# Patient Record
Sex: FEMALE | Race: WHITE | NOT HISPANIC OR LATINO | URBAN - METROPOLITAN AREA
[De-identification: names, ages, dates, MRNs, and addresses within clinical notes are randomized per-mention and may not be internally consistent; named-entity substitution may affect disease eponyms.]

---

## 2019-07-05 PROBLEM — K92.1 GASTROINTESTINAL HEMORRHAGE WITH MELENA: Status: ACTIVE | Noted: 2019-07-05

## 2019-07-05 PROBLEM — K62.5 RECTAL BLEEDING: Status: ACTIVE | Noted: 2019-07-05

## 2019-07-05 PROBLEM — D62 ACUTE BLOOD LOSS ANEMIA: Status: ACTIVE | Noted: 2019-07-05

## 2019-07-31 ENCOUNTER — OFFICE VISIT (OUTPATIENT)
Dept: GASTROENTEROLOGY | Facility: CLINIC | Age: 64
End: 2019-07-31
Payer: COMMERCIAL

## 2019-07-31 ENCOUNTER — LAB VISIT (OUTPATIENT)
Dept: LAB | Facility: HOSPITAL | Age: 64
End: 2019-07-31
Attending: NURSE PRACTITIONER
Payer: COMMERCIAL

## 2019-07-31 VITALS
BODY MASS INDEX: 33.51 KG/M2 | DIASTOLIC BLOOD PRESSURE: 74 MMHG | WEIGHT: 189.13 LBS | HEIGHT: 63 IN | HEART RATE: 85 BPM | SYSTOLIC BLOOD PRESSURE: 176 MMHG

## 2019-07-31 DIAGNOSIS — Z09 HOSPITAL DISCHARGE FOLLOW-UP: Primary | ICD-10-CM

## 2019-07-31 DIAGNOSIS — D50.9 IRON DEFICIENCY ANEMIA, UNSPECIFIED IRON DEFICIENCY ANEMIA TYPE: ICD-10-CM

## 2019-07-31 DIAGNOSIS — R03.0 ELEVATED BLOOD PRESSURE READING: ICD-10-CM

## 2019-07-31 DIAGNOSIS — Z98.84 HX OF BARIATRIC SURGERY: ICD-10-CM

## 2019-07-31 DIAGNOSIS — Z98.890 HISTORY OF ESOPHAGOGASTRODUODENOSCOPY (EGD): ICD-10-CM

## 2019-07-31 DIAGNOSIS — R19.5 OCCULT BLOOD POSITIVE STOOL: ICD-10-CM

## 2019-07-31 DIAGNOSIS — E83.51 HYPOCALCEMIA: ICD-10-CM

## 2019-07-31 DIAGNOSIS — Q27.30 ARTERIOVENOUS MALFORMATION (AVM): ICD-10-CM

## 2019-07-31 LAB
ERYTHROCYTE [DISTWIDTH] IN BLOOD BY AUTOMATED COUNT: 17.3 % (ref 11.5–14.5)
FERRITIN SERPL-MCNC: 4 NG/ML (ref 20–300)
HCT VFR BLD AUTO: 29.3 % (ref 37–48.5)
HGB BLD-MCNC: 8.3 G/DL (ref 12–16)
IRON SERPL-MCNC: 22 UG/DL (ref 30–160)
MCH RBC QN AUTO: 24 PG (ref 27–31)
MCHC RBC AUTO-ENTMCNC: 28.3 G/DL (ref 32–36)
MCV RBC AUTO: 85 FL (ref 82–98)
PLATELET # BLD AUTO: 422 K/UL (ref 150–350)
PMV BLD AUTO: 10 FL (ref 9.2–12.9)
RBC # BLD AUTO: 3.46 M/UL (ref 4–5.4)
SATURATED IRON: 4 % (ref 20–50)
TOTAL IRON BINDING CAPACITY: 517 UG/DL (ref 250–450)
TRANSFERRIN SERPL-MCNC: 349 MG/DL (ref 200–375)
WBC # BLD AUTO: 5.99 K/UL (ref 3.9–12.7)

## 2019-07-31 PROCEDURE — 99999 PR PBB SHADOW E&M-NEW PATIENT-LVL III: CPT | Mod: PBBFAC,,, | Performed by: NURSE PRACTITIONER

## 2019-07-31 PROCEDURE — 36415 COLL VENOUS BLD VENIPUNCTURE: CPT | Mod: PO

## 2019-07-31 PROCEDURE — 3008F BODY MASS INDEX DOCD: CPT | Mod: CPTII,S$GLB,, | Performed by: NURSE PRACTITIONER

## 2019-07-31 PROCEDURE — 82728 ASSAY OF FERRITIN: CPT

## 2019-07-31 PROCEDURE — 99203 PR OFFICE/OUTPT VISIT, NEW, LEVL III, 30-44 MIN: ICD-10-PCS | Mod: S$GLB,,, | Performed by: NURSE PRACTITIONER

## 2019-07-31 PROCEDURE — 3008F PR BODY MASS INDEX (BMI) DOCUMENTED: ICD-10-PCS | Mod: CPTII,S$GLB,, | Performed by: NURSE PRACTITIONER

## 2019-07-31 PROCEDURE — 83540 ASSAY OF IRON: CPT

## 2019-07-31 PROCEDURE — 99999 PR PBB SHADOW E&M-NEW PATIENT-LVL III: ICD-10-PCS | Mod: PBBFAC,,, | Performed by: NURSE PRACTITIONER

## 2019-07-31 PROCEDURE — 85027 COMPLETE CBC AUTOMATED: CPT

## 2019-07-31 PROCEDURE — 99203 OFFICE O/P NEW LOW 30 MIN: CPT | Mod: S$GLB,,, | Performed by: NURSE PRACTITIONER

## 2019-07-31 RX ORDER — LANOLIN ALCOHOL/MO/W.PET/CERES
1000 CREAM (GRAM) TOPICAL DAILY
COMMUNITY

## 2019-07-31 NOTE — PROGRESS NOTES
"Subjective:       Patient ID: Baliwnder Rodriguez is a 64 y.o. female Body mass index is 33.51 kg/m².    Chief Complaint: Labs Only (gastric AVM) and Establish Care (Dr. Cadrenas)    This patient is new to me.    Patient is here to establish care with Dr. Cardenas. Patient reports history of multiple gastric AVMs throughout the years that have required numerous EGDs and hospitalization. Patient reports she can sense when an episode is about to occur/is occurring with symptoms of fatigue & dark stools. Patient reports the multiple hospitalizations has become quite expensive and reports she wants to establish care with a GI doctor who she can call when she notices GI symptoms so she can have blood work done and scheduled outpatient endoscopies to try an avoid hospitalizations.    Reviewed hospital discharge summary: "Admission Date: 7/5/2019  Hospital Length of Stay: 0 days  Discharge Date and Time: 7/7/2019  2:45 PM  Attending Physician: Makayla att. providers found   Discharging Provider: Pierce Lenz MD  Primary Care Provider: Primary Doctor Makayla  HPI:   The patient is a 63-year-old woman with no significant past medical history other than gastric AVMs requiring frequent transfusions throughout her life.  Her last hospital stay was 7 years ago.  She has not had any bleeding since then.  She started with melanotic stools yesterday along with fatigue.  She denies any abdominal discomfort.  She presents to the ED this evening where laboratory studies confirm hemoglobin of 7.7.  She is stable hemodynamically.  2 units of packed red blood cells have been ordered.  Gastroenterology, Dr. Ochoa has also been consulted through the ED.  She is currently receiving her 1st unit of packed red blood cells.  IV Protonix loading dose and subsequent drip has been initiated.  Hospital Medicine has been called for further evaluation.  Procedure(s) (LRB):  EGD (ESOPHAGOGASTRODUODENOSCOPY) (N/A)  Hospital Course:   Patient admitted with suspected " "upper GI bleed. Patient placed on PPI drip. Extensive history of AVM bleeds in the past. GI consulted and plans made for EGD. EGD with no obvious source of bleeding. H and H remained stable and no further bleeding. Patient eventually discharged home with close follow up including GI follow up."    Reviewed Dr. Ochoa's operative note from 7/6/19 EGD: "ASSESSMENT:  As above.  We will plan colonoscopy.  If this is negative, the   patient would likely benefit from a double balloon-assisted enteroscopy in order   to evaluate her Basilio limb."    GI Problem   Primary symptoms do not include fever, weight loss, fatigue, abdominal pain, nausea, vomiting, diarrhea, melena, hematochezia or dysuria. The problem has been resolved (patient reports feeling great since hospitalization).   The illness does not include chills, anorexia, dysphagia, odynophagia or constipation. Significant associated medical issues include PUD. Associated medical issues do not include inflammatory bowel disease, GERD or gastric bypass. Associated medical issues comments: history of gastric stapling; patient denies history of gastric bypass.     Review of Systems   Constitutional: Negative for appetite change, chills, fatigue, fever and weight loss.   HENT: Negative for sore throat and trouble swallowing.    Respiratory: Negative for cough, choking and shortness of breath.    Cardiovascular: Negative for chest pain.   Gastrointestinal: Negative for abdominal pain, anal bleeding, anorexia, blood in stool, constipation, diarrhea, dysphagia, hematochezia, melena, nausea, rectal pain and vomiting.   Genitourinary: Negative for difficulty urinating, dysuria and flank pain.   Neurological: Negative for weakness.       No LMP recorded. Patient is postmenopausal.  Past Medical History:   Diagnosis Date    AVM (arteriovenous malformation) of stomach, acquired with hemorrhage 1993     Past Surgical History:   Procedure Laterality Date    COLONOSCOPY  ~2015    " "Dr. Jacobsen    COLONOSCOPY  04/07/2009    Dr. Jacobsen, sent for scanning: melena throughout colon, prominent hemorrhoids, tortous colon, otherwise unremarkable findings    EGD (ESOPHAGOGASTRODUODENOSCOPY) N/A 7/6/2019    [facility] Virgilio [facility] Jr[facility]    ENTEROSCOPIC PUSH PROCEDURE  04/08/2009    Dr. Jacobsen, sent for scanning: distal esophageal erythema, gastric remnant appeared to be unremarkable, "the limbs of the Basilio-en-Y appeared to be unremarkable as well as the conduit from the stomach to the Basilio-en-Y"    ENTEROSCOPIC PUSH PROCEDURE  12/05/2009    Dr. Jacobsen, sent for scanning: blood seen, bleeder not found, suspect AVM, a couple of erosions in stomach    ENTEROSCOPIC PUSH PROCEDURE  12/06/2009    Dr. Jacobsen, sent for scanning: distal esophageal erythema, s/p gastric bypass, large arteriovenous malformation noted in the proximal jenunum successfully ablated, site tattooed    gastric stapling      UPPER GASTROINTESTINAL ENDOSCOPY  07/06/2019    Dr. Ochoa, in notes: "S/P Basilio - N - Y gastric bypass" & "No source GIB seen"    UPPER GASTROINTESTINAL ENDOSCOPY  07/22/2010    EGD with push enteroscopy; Dr. Jacobsen, sent for scanning (hard to read handwriting)    UPPER GASTROINTESTINAL ENDOSCOPY  04/05/2009    Dr. Delacruz, sent for scanning: non-bleeding anastomotic ulcer, post gastric stapling    UPPER GASTROINTESTINAL ENDOSCOPY  12/04/2009    Dr. Jacobsen, sent for scanning, EGD with push enteroscopy: distal esophageal erythema and severe gastroparesis, blood seen in duodenum and proximal jejenum    video capsule endoscopy       Family History   Problem Relation Age of Onset    Colon cancer Neg Hx     Crohn's disease Neg Hx     Ulcerative colitis Neg Hx     Celiac disease Neg Hx     Esophageal cancer Neg Hx     Stomach cancer Neg Hx      Wt Readings from Last 10 Encounters:   07/31/19 85.8 kg (189 lb 2.5 oz)   07/05/19 83 kg (182 lb 14.4 oz)     Lab Results   Component Value Date    WBC 5.99 " 07/31/2019    HGB 8.3 (L) 07/31/2019    HCT 29.3 (L) 07/31/2019    MCV 85 07/31/2019     (H) 07/31/2019     Lab Results   Component Value Date    IRON 22 (L) 07/31/2019    TIBC 517 (H) 07/31/2019    FERRITIN 4 (L) 07/31/2019     CMP  Sodium   Date Value Ref Range Status   07/07/2019 139 136 - 145 mmol/L Final     Potassium   Date Value Ref Range Status   07/07/2019 3.5 3.5 - 5.1 mmol/L Final     Chloride   Date Value Ref Range Status   07/07/2019 107 95 - 110 mmol/L Final     CO2   Date Value Ref Range Status   07/07/2019 25 22 - 31 mmol/L Final     Glucose   Date Value Ref Range Status   07/07/2019 103 70 - 110 mg/dL Final     Comment:     The ADA recommends the following guidelines for fasting glucose:  Normal:       less than 100 mg/dL  Prediabetes:  100 mg/dL to 125 mg/dL  Diabetes:     126 mg/dL or higher       BUN, Bld   Date Value Ref Range Status   07/07/2019 12 7 - 18 mg/dL Final     Creatinine   Date Value Ref Range Status   07/07/2019 0.61 0.50 - 1.40 mg/dL Final     Calcium   Date Value Ref Range Status   07/07/2019 7.9 (L) 8.4 - 10.2 mg/dL Final     Total Protein   Date Value Ref Range Status   07/07/2019 5.8 (L) 6.0 - 8.4 g/dL Final     Albumin   Date Value Ref Range Status   07/07/2019 3.4 (L) 3.5 - 5.2 g/dL Final     Total Bilirubin   Date Value Ref Range Status   07/07/2019 0.3 0.2 - 1.3 mg/dL Final     Alkaline Phosphatase   Date Value Ref Range Status   07/07/2019 55 38 - 145 U/L Final     AST   Date Value Ref Range Status   07/07/2019 19 14 - 36 U/L Final     ALT   Date Value Ref Range Status   07/07/2019 23 10 - 44 U/L Final     Anion Gap   Date Value Ref Range Status   07/07/2019 7 (L) 8 - 16 mmol/L Final     eGFR if    Date Value Ref Range Status   07/07/2019 >60 >60 mL/min/1.73 m^2 Final     eGFR if non    Date Value Ref Range Status   07/07/2019 >60 >60 mL/min/1.73 m^2 Final     Comment:     Calculation used to obtain the estimated glomerular  "filtration  rate (eGFR) is the CKD-EPI equation.        Lab Results   Component Value Date    TSH 3.900 07/05/2019     Lab Results   Component Value Date    OCCULTBLOOD Positive (A) 07/05/2019     Reviewed prior medical records including endoscopy history (see surgical history).  Reviewed medical records received from patient (from [facility] & Dr. Jacobsen), summarized below and in medical & surgical history (endoscopies, etc), & given to nurse to be scanned into system:   See records for lab results.  7/22/10-7/23/10 discharge summary reviewed: "patient was admitted after the push enteroscopy because we found significant bleeding and anemia." "She has a known arteriovenous malformation right near the anastomosis distally in the jejenum."  4/4/09-4/9/09 discharge summary reviewed: "the patient apparently has an ulcer at the anastomosis on upper gastrointestinal endoscopy. This was a very small ulcer." "on April 7, 2009 she underwent colonoscopy. The colonoscopy showed black stool throughout, but no etiology of her bleeding." "She underwent a push enteroscopy on April 8, 2009, but again that did not show any etiology. The previously seen small ulcer had completely healed by the time the push enteroscopy was done."  Objective:      Physical Exam   Constitutional: She is oriented to person, place, and time. She appears well-developed and well-nourished. No distress.   HENT:   Mouth/Throat: Oropharynx is clear and moist and mucous membranes are normal. No oral lesions. No oropharyngeal exudate.   Eyes: Pupils are equal, round, and reactive to light. Conjunctivae are normal. No scleral icterus.   Pulmonary/Chest: Effort normal and breath sounds normal. No respiratory distress. She has no wheezes.   Abdominal: Soft. Normal appearance and bowel sounds are normal. She exhibits no distension, no abdominal bruit and no mass. There is no tenderness. There is no rigidity, no rebound, no guarding, no tenderness at " McBurney's point and negative Lerma's sign.   Neurological: She is alert and oriented to person, place, and time.   Skin: Skin is warm and dry. No rash noted. She is not diaphoretic. No erythema. No pallor.   Non-jaundiced   Psychiatric: She has a normal mood and affect. Her behavior is normal. Judgment and thought content normal.   Nursing note and vitals reviewed.      Assessment:       1. Hospital discharge follow-up    2. History of esophagogastroduodenoscopy (EGD)    3. History of arteriovenous malformation (AVM) of intestines    4. Iron deficiency anemia, unspecified iron deficiency anemia type    5. Occult blood positive stool    6. Hypocalcemia    7. Hx of bariatric surgery    8. Elevated blood pressure reading        Plan:       Hospital discharge follow-up  Recommend follow-up with Primary Care Provider for continued evaluation and management.    History of esophagogastroduodenoscopy (EGD), History of arteriovenous malformation (AVM) of intestines, Iron deficiency anemia, unspecified iron deficiency anemia type & Occult blood positive stool  -     CBC Without Differential; Future; Expected date: 07/31/2019  -     Iron and TIBC; Future; Expected date: 07/31/2019  -     Ferritin; Future; Expected date: 07/31/2019  - continue oral iron supplement as directed  - discussed with patient the different ways that anemia occurs: blood loss (such as from the gi tract), the body is not making enough, or the body is breaking down the rbcs too quickly; recommend colonoscopy to further evaluate gi tract for possible blood loss and pending results of endoscopies, possible EGD with small bowel enteroscopy/UGI with Small Bowel Follow Through/video capsule study  -follow-up with PCP and/or hematology for continued evaluation and management    Hypocalcemia  Recommend follow-up with Primary Care Provider for continued evaluation and management.    Hx of bariatric surgery (patient reports history of gastric stapling; patient  denies history of gastric bypass surgery)  Recommend follow-up with bariatric surgery for continued evaluation and management.    Elevated blood pressure reading  - instructed patient to monitor blood pressure at home and follow-up with PCP &/or cardiologist  - If blood pressure remains elevated and/or experiencing symptoms of headache, chest pain, shortness of breath, and/or blurred vision, recommend going to ER for further evaluation and management    Follow up in about 1 month (around 8/31/2019), or if symptoms worsen or fail to improve.      If no improvement in symptoms or symptoms worsen, call/follow-up at clinic or go to ER.

## 2019-07-31 NOTE — PATIENT INSTRUCTIONS

## 2019-08-02 ENCOUNTER — TELEPHONE (OUTPATIENT)
Dept: GASTROENTEROLOGY | Facility: CLINIC | Age: 64
End: 2019-08-02

## 2019-08-02 DIAGNOSIS — D50.9 IRON DEFICIENCY ANEMIA, UNSPECIFIED IRON DEFICIENCY ANEMIA TYPE: Primary | ICD-10-CM

## 2019-08-02 NOTE — TELEPHONE ENCOUNTER
Please call to inform & review the results with the patient- blood counts showed anemia remains and is stable and appears to be improving. Iron levels are decreased.  Continue with previous recommendations, including colonoscopy as scheduled and continue iron supplement as directed.  Repeat blood work in 4-6 weeks.    If no improvement in symptoms or symptoms worsen, call/follow-up at clinic or go to ER.  Please release results to patient's mychart once you have discussed results and recommendations with patient.  Thanks,

## 2019-08-08 ENCOUNTER — ANESTHESIA EVENT (OUTPATIENT)
Dept: ENDOSCOPY | Facility: HOSPITAL | Age: 64
End: 2019-08-08
Payer: COMMERCIAL

## 2019-08-09 ENCOUNTER — HOSPITAL ENCOUNTER (OUTPATIENT)
Facility: HOSPITAL | Age: 64
Discharge: HOME | End: 2019-08-09
Attending: INTERNAL MEDICINE | Admitting: INTERNAL MEDICINE
Payer: COMMERCIAL

## 2019-08-09 ENCOUNTER — ANESTHESIA (OUTPATIENT)
Dept: ENDOSCOPY | Facility: HOSPITAL | Age: 64
End: 2019-08-09
Payer: COMMERCIAL

## 2019-08-09 VITALS
WEIGHT: 190 LBS | SYSTOLIC BLOOD PRESSURE: 118 MMHG | HEART RATE: 74 BPM | HEIGHT: 62 IN | BODY MASS INDEX: 34.96 KG/M2 | TEMPERATURE: 98 F | OXYGEN SATURATION: 98 % | DIASTOLIC BLOOD PRESSURE: 63 MMHG | RESPIRATION RATE: 16 BRPM

## 2019-08-09 DIAGNOSIS — K62.5 RECTAL BLEEDING: ICD-10-CM

## 2019-08-09 PROCEDURE — D9220A PRA ANESTHESIA: Mod: CRNA,,, | Performed by: NURSE ANESTHETIST, CERTIFIED REGISTERED

## 2019-08-09 PROCEDURE — D9220A PRA ANESTHESIA: Mod: ANES,,, | Performed by: ANESTHESIOLOGY

## 2019-08-09 PROCEDURE — 37000008 HC ANESTHESIA 1ST 15 MINUTES: Mod: PO | Performed by: INTERNAL MEDICINE

## 2019-08-09 PROCEDURE — 37000009 HC ANESTHESIA EA ADD 15 MINS: Mod: PO | Performed by: INTERNAL MEDICINE

## 2019-08-09 PROCEDURE — D9220A PRA ANESTHESIA: ICD-10-PCS | Mod: CRNA,,, | Performed by: NURSE ANESTHETIST, CERTIFIED REGISTERED

## 2019-08-09 PROCEDURE — D9220A PRA ANESTHESIA: ICD-10-PCS | Mod: ANES,,, | Performed by: ANESTHESIOLOGY

## 2019-08-09 PROCEDURE — 63600175 PHARM REV CODE 636 W HCPCS: Mod: PO | Performed by: NURSE ANESTHETIST, CERTIFIED REGISTERED

## 2019-08-09 PROCEDURE — 45378 DIAGNOSTIC COLONOSCOPY: CPT | Mod: ,,, | Performed by: INTERNAL MEDICINE

## 2019-08-09 PROCEDURE — 45378 DIAGNOSTIC COLONOSCOPY: CPT | Mod: PO | Performed by: INTERNAL MEDICINE

## 2019-08-09 PROCEDURE — 45378 PR COLONOSCOPY,DIAGNOSTIC: ICD-10-PCS | Mod: ,,, | Performed by: INTERNAL MEDICINE

## 2019-08-09 PROCEDURE — 63600175 PHARM REV CODE 636 W HCPCS: Mod: PO | Performed by: ANESTHESIOLOGY

## 2019-08-09 RX ORDER — PROPOFOL 10 MG/ML
VIAL (ML) INTRAVENOUS
Status: DISCONTINUED | OUTPATIENT
Start: 2019-08-09 | End: 2019-08-09

## 2019-08-09 RX ORDER — SODIUM CHLORIDE, SODIUM LACTATE, POTASSIUM CHLORIDE, CALCIUM CHLORIDE 600; 310; 30; 20 MG/100ML; MG/100ML; MG/100ML; MG/100ML
INJECTION, SOLUTION INTRAVENOUS CONTINUOUS
Status: DISCONTINUED | OUTPATIENT
Start: 2019-08-09 | End: 2019-08-09 | Stop reason: HOSPADM

## 2019-08-09 RX ORDER — LIDOCAINE HYDROCHLORIDE 10 MG/ML
1 INJECTION, SOLUTION EPIDURAL; INFILTRATION; INTRACAUDAL; PERINEURAL ONCE
Status: DISCONTINUED | OUTPATIENT
Start: 2019-08-09 | End: 2019-08-09 | Stop reason: HOSPADM

## 2019-08-09 RX ORDER — LIDOCAINE HCL/PF 100 MG/5ML
SYRINGE (ML) INTRAVENOUS
Status: DISCONTINUED | OUTPATIENT
Start: 2019-08-09 | End: 2019-08-09

## 2019-08-09 RX ORDER — SODIUM CHLORIDE 0.9 % (FLUSH) 0.9 %
10 SYRINGE (ML) INJECTION
Status: DISCONTINUED | OUTPATIENT
Start: 2019-08-09 | End: 2019-08-09 | Stop reason: HOSPADM

## 2019-08-09 RX ORDER — SODIUM CHLORIDE, SODIUM LACTATE, POTASSIUM CHLORIDE, CALCIUM CHLORIDE 600; 310; 30; 20 MG/100ML; MG/100ML; MG/100ML; MG/100ML
INJECTION, SOLUTION INTRAVENOUS CONTINUOUS
Status: DISCONTINUED | OUTPATIENT
Start: 2019-08-09 | End: 2019-08-09

## 2019-08-09 RX ADMIN — PROPOFOL 50 MG: 10 INJECTION, EMULSION INTRAVENOUS at 09:08

## 2019-08-09 RX ADMIN — PROPOFOL 100 MG: 10 INJECTION, EMULSION INTRAVENOUS at 09:08

## 2019-08-09 RX ADMIN — LIDOCAINE HYDROCHLORIDE 100 MG: 20 INJECTION, SOLUTION INTRAVENOUS at 09:08

## 2019-08-09 RX ADMIN — SODIUM CHLORIDE, SODIUM LACTATE, POTASSIUM CHLORIDE, AND CALCIUM CHLORIDE: .6; .31; .03; .02 INJECTION, SOLUTION INTRAVENOUS at 09:08

## 2019-08-09 NOTE — BRIEF OP NOTE
Discharge Note  Short Stay      SUMMARY     Admit Date: 8/9/2019    Attending Physician: Dudley Cardenas Jr., MD     Discharge Physician: Dudley Cardenas Jr., MD    Discharge Date: 8/9/2019 10:17 AM    Final Diagnosis: Iron deficiency anemia, unspecified iron deficiency anemia type [D50.9]  Impression:  - Redundant colon.                       - The examination was otherwise normal.                       - The examined portion of the ileum was normal.                       - No specimens collected.  Recommendation:      - Discharge patient to home.                       - High fiber diet.                       - Use fiber, for example Benefiber, Citrucel,                        Fibercon, Konsyl or Metamucil.                       - Take a PROBIOTIC, such as ALIGN or CULTURELLE or                        MICHELLE-Q (all non-prescription), every day for a                        month.                       - Repeat colonoscopy in 8 years for screening                        purposes.                       - Continue present medications.                       - Patient has a contact number available for                        emergencies. The signs and symptoms of potential                        delayed complications were discussed with the                        patient. Return to normal activities tomorrow.                        Written discharge instructions were provided to the                        patient.                       - Return to normal activities tomorrow.  Dudley Cardenas MD  8/9/2019  Disposition: HOME OR SELF CARE    Patient Instructions:   Current Discharge Medication List      CONTINUE these medications which have NOT CHANGED    Details   cyanocobalamin (VITAMIN B-12) 1000 MCG tablet Take 1,000 mcg by mouth once daily.      ferrous sulfate (FEOSOL) 325 mg (65 mg iron) Tab tablet Take 1 tablet (325 mg total) by mouth daily with breakfast.  Refills: 0             Discharge Procedure Orders  (must include Diet, Follow-up, Activity)    Follow Up:  Follow up with PCP as per your routine.  Please follow a high fiber diet.  Activity as tolerated.    No driving day of procedure.    PROBIOTICS:  Now that your colon is so cleaned out, now is a good time for a round of PROBIOTICS.  Take a Probiotic product such as Benefiber or Align or Culturelle or Tiffanie-Q, every day for a month.                  (The products listed are non-prescription, but you may need to ask the pharmacist for their location.)  Repeat this 4-6 times a year.

## 2019-08-09 NOTE — H&P
"History & Physical - Short Stay  Gastroenterology      SUBJECTIVE:     Procedure: Colonoscopy    Chief Complaint/Indication for Procedure: Rectal bleeding    History of Present Illness:  Office Visit     7/31/2019  [facility]      MELVIN Mckeon   Gastroenterology   Hospital discharge follow-up +7 more   Dx   Labs Only   , Establish Care   ; Referred by Aaareferral Self   Reason for Visit    Progress Notes        Subjective:       Patient ID: Balwinder Rodriguez is a 64 y.o. female Body mass index is 33.51 kg/m².     Chief Complaint: Labs Only (gastric AVM) and Establish Care (Dr. Cardenas)     This patient is new to me.     Patient is here to establish care with Dr. Cardenas. Patient reports history of multiple gastric AVMs throughout the years that have required numerous EGDs and hospitalization. Patient reports she can sense when an episode is about to occur/is occurring with symptoms of fatigue & dark stools. Patient reports the multiple hospitalizations has become quite expensive and reports she wants to establish care with a GI doctor who she can call when she notices GI symptoms so she can have blood work done and scheduled outpatient endoscopies to try an avoid hospitalizations.     Reviewed hospital discharge summary: "Admission Date: 7/5/2019  Hospital Length of Stay: 0 days  Discharge Date and Time: 7/7/2019  2:45 PM  Attending Physician: No att. providers found   Discharging Provider: Pierce Lenz MD  Primary Care Provider: Primary Doctor No  HPI:   The patient is a 63-year-old woman with no significant past medical history other than gastric AVMs requiring frequent transfusions throughout her life.  Her last hospital stay was 7 years ago.  She has not had any bleeding since then.  She started with melanotic stools yesterday along with fatigue.  She denies any abdominal discomfort.  She presents to the ED this evening where laboratory studies confirm hemoglobin of 7.7.  She is " "stable hemodynamically.  2 units of packed red blood cells have been ordered.  Gastroenterology, Dr. Ochoa has also been consulted through the ED.  She is currently receiving her 1st unit of packed red blood cells.  IV Protonix loading dose and subsequent drip has been initiated.  Hospital Medicine has been called for further evaluation.  Procedure(s) (LRB):  EGD (ESOPHAGOGASTRODUODENOSCOPY) (N/A)  Hospital Course:   Patient admitted with suspected upper GI bleed. Patient placed on PPI drip. Extensive history of AVM bleeds in the past. GI consulted and plans made for EGD. EGD with no obvious source of bleeding. H and H remained stable and no further bleeding. Patient eventually discharged home with close follow up including GI follow up."     Reviewed Dr. Ochoa's operative note from 7/6/19 EGD: "ASSESSMENT:  As above.  We will plan colonoscopy.  If this is negative, the   patient would likely benefit from a double balloon-assisted enteroscopy in order   to evaluate her Basilio limb."     GI Problem   Primary symptoms do not include fever, weight loss, fatigue, abdominal pain, nausea, vomiting, diarrhea, melena, hematochezia or dysuria. The problem has been resolved (patient reports feeling great since hospitalization).   The illness does not include chills, anorexia, dysphagia, odynophagia or constipation. Significant associated medical issues include PUD. Associated medical issues do not include inflammatory bowel disease, GERD or gastric bypass. Associated medical issues comments: history of gastric stapling; patient denies history of gastric bypass.     Assessment:       1. Hospital discharge follow-up    2. History of esophagogastroduodenoscopy (EGD)    3. History of arteriovenous malformation (AVM) of intestines    4. Iron deficiency anemia, unspecified iron deficiency anemia type    5. Occult blood positive stool    6. Hypocalcemia    7. Hx of bariatric surgery    8. Elevated blood pressure reading        Plan: "       Hospital discharge follow-up  Recommend follow-up with Primary Care Provider for continued evaluation and management.     History of esophagogastroduodenoscopy (EGD), History of arteriovenous malformation (AVM) of intestines, Iron deficiency anemia, unspecified iron deficiency anemia type & Occult blood positive stool  -     CBC Without Differential; Future; Expected date: 07/31/2019  -     Iron and TIBC; Future; Expected date: 07/31/2019  -     Ferritin; Future; Expected date: 07/31/2019  - continue oral iron supplement as directed  - discussed with patient the different ways that anemia occurs: blood loss (such as from the gi tract), the body is not making enough, or the body is breaking down the rbcs too quickly; recommend colonoscopy to further evaluate gi tract for possible blood loss and pending results of endoscopies, possible EGD with small bowel enteroscopy/UGI with Small Bowel Follow Through/video capsule study  -follow-up with PCP and/or hematology for continued evaluation and management     Hypocalcemia  Recommend follow-up with Primary Care Provider for continued evaluation and management.     Hx of bariatric surgery (patient reports history of gastric stapling; patient denies history of gastric bypass surgery)  Recommend follow-up with bariatric surgery for continued evaluation and management.     Elevated blood pressure reading  - instructed patient to monitor blood pressure at home and follow-up with PCP &/or cardiologist  - If blood pressure remains elevated and/or experiencing symptoms of headache, chest pain, shortness of breath, and/or blurred vision, recommend going to ER for further evaluation and management     Follow up in about 1 month (around 8/31/2019), or if symptoms worsen or fail to improve.            Results for MACIEL FLORENCE (MRN [id number]) as of 8/8/2019 19:37   Ref. Range 7/5/2019 18:14 7/6/2019 05:32 7/6/2019 14:13 7/7/2019 05:38 7/31/2019 12:41   WBC Latest Ref Range:  "3.90 - 12.70 K/uL 15.43 (H) 12.11  7.92 5.99   RBC Latest Ref Range: 4.00 - 5.40 M/uL 3.17 (L) 3.48 (L)  3.23 (L) 3.46 (L)   Hemoglobin Latest Ref Range: 12.0 - 16.0 g/dL 7.7 (L) 9.3 (L) 9.1 (L) 8.6 (L) 8.3 (L)   Hematocrit Latest Ref Range: 37.0 - 48.5 % 24.8 (L) 28.4 (L) 27.4 (L) 26.6 (L) 29.3 (L)   MCV Latest Ref Range: 82 - 98 fL 78 (L) 82  82 85   MCH Latest Ref Range: 27.0 - 31.0 pg 24.3 (L) 26.7 (L)  26.6 (L) 24.0 (L)   MCHC Latest Ref Range: 32.0 - 36.0 g/dL 31.0 (L) 32.7  32.3 28.3 (L)   RDW Latest Ref Range: 11.5 - 14.5 % 17.4 (H) 17.0 (H)  17.6 (H) 17.3 (H)   Platelets Latest Ref Range: 150 - 350 K/uL 465 (H) 303  294 422 (H)         PTA Medications   Medication Sig    cyanocobalamin (VITAMIN B-12) 1000 MCG tablet Take 1,000 mcg by mouth once daily.    ferrous sulfate (FEOSOL) 325 mg (65 mg iron) Tab tablet Take 1 tablet (325 mg total) by mouth daily with breakfast.       Review of patient's allergies indicates:  No Known Allergies     Past Medical History:   Diagnosis Date    AVM (arteriovenous malformation) of stomach, acquired with hemorrhage 1993    Last time cauterized 2012; Blood tranfusion 7/6/19    Encounter for blood transfusion     multiple     Past Surgical History:   Procedure Laterality Date    COLONOSCOPY  ~2015    Dr. Jacobsen    COLONOSCOPY  04/07/2009    Dr. Jacobsen, sent for scanning: melena throughout colon, prominent hemorrhoids, tortous colon, otherwise unremarkable findings    EGD (ESOPHAGOGASTRODUODENOSCOPY) N/A 7/6/2019    Performed by Virgilio [facility] Jr[facility]    ENTEROSCOPIC PUSH PROCEDURE  04/08/2009    Dr. Jacobsen, sent for scanning: distal esophageal erythema, gastric remnant appeared to be unremarkable, "the limbs of the Basilio-en-Y appeared to be unremarkable as well as the conduit from the stomach to the Basilio-en-Y"    ENTEROSCOPIC PUSH PROCEDURE  12/05/2009    Dr. Jacobsen, sent for scanning: blood seen, bleeder not found, suspect AVM, a couple of erosions in stomach " "   ENTEROSCOPIC PUSH PROCEDURE  12/06/2009    Dr. Jacobsen, sent for scanning: distal esophageal erythema, s/p gastric bypass, large arteriovenous malformation noted in the proximal jenunum successfully ablated, site tattooed    gastric stapling      UPPER GASTROINTESTINAL ENDOSCOPY  07/06/2019    Dr. Ochoa, in notes: "S/P Basilio - N - Y gastric bypass" & "No source GIB seen"    UPPER GASTROINTESTINAL ENDOSCOPY  07/22/2010    EGD with push enteroscopy; Dr. Jacobsen, sent for scanning (hard to read handwriting)    UPPER GASTROINTESTINAL ENDOSCOPY  04/05/2009    Dr. Delacruz, sent for scanning: non-bleeding anastomotic ulcer, post gastric stapling    UPPER GASTROINTESTINAL ENDOSCOPY  12/04/2009    Dr. Jacobsen, sent for scanning, EGD with push enteroscopy: distal esophageal erythema and severe gastroparesis, blood seen in duodenum and proximal jejenum    video capsule endoscopy       Family History   Problem Relation Age of Onset    Colon cancer Neg Hx     Crohn's disease Neg Hx     Ulcerative colitis Neg Hx     Celiac disease Neg Hx     Esophageal cancer Neg Hx     Stomach cancer Neg Hx      Social History     Tobacco Use    Smoking status: Never Smoker    Smokeless tobacco: Never Used   Substance Use Topics    Alcohol use: Yes     Frequency: Never     Comment: rarely- maybe once a year    Drug use: Never         OBJECTIVE:     Vital Signs (Most Recent)  Temp: 97.9 °F (36.6 °C) (08/09/19 0854)  Pulse: 80 (08/09/19 0854)  Resp: 15 (08/09/19 0854)  BP: (!) 171/74 (08/09/19 0854)  SpO2: 100 % (08/09/19 0854)    Physical Exam:  :Ht 5' 3" (1.6 m)   Wt 85.8 kg (189 lb 2.5 oz)   BMI 33.51 kg/m²           GENERAL:  Comfortable, in no acute distress.                                 HEENT EXAM:  Nonicteric.  No adenopathy.  Oropharynx is clear.     NECK:  Supple.                                                               LUNGS:  Clear.                                                               CARDIAC:  Regular " rate and rhythm.  S1, S2.  No murmur.               ABDOMEN:  Soft, positive bowel sounds, nontender.  No hepatosplenomegaly or masses.  No rebound or guarding.                EXTREMITIES:  No edema.     MENTAL STATUS:  Alert and oriented.    ASSESSMENT/PLAN:     Assessment: Rectal bleeding    Plan: Colonoscopy    Anesthesia Plan:   MAC / General Anaesthesia    ASA Grade: ASA 2 - Patient with mild systemic disease with no functional limitations    MALLAMPATI SCORE: II (hard and soft palate, upper portion of tonsils anduvula visible)

## 2019-08-09 NOTE — DISCHARGE INSTRUCTIONS
Recovery After Procedural Sedation (Adult)  You have been given medicine by vein to make you sleep during your surgery. This may have included both a pain medicine and sleeping medicine. Most of the effects have worn off. But you may still have some drowsiness for the next 6 to 8 hours.  Home care  Follow these guidelines when you get home:  · For the next 8 hours, you should be watched by a responsible adult. This person should make sure your condition is not getting worse.  · Don't drink any alcohol for the next 24 hours.  · Don't drive, operate dangerous machinery, or make important business or personal decisions during the next 24 hours.  Note: Your healthcare provider may tell you not to take any medicine by mouth for pain or sleep in the next 4 hours. These medicines may react with the medicines you were given in the hospital. This could cause a much stronger response than usual.  Follow-up care  Follow up with your healthcare provider if you are not alert and back to your usual level of activity within 12 hours.  When to seek medical advice  Call your healthcare provider right away if any of these occur:  · Drowsiness gets worse  · Weakness or dizziness gets worse  · Repeated vomiting  · You can't be awakened   Date Last Reviewed: 10/18/2016  © 5031-1351 [employer]. [address] [place] [address], [place] [address]. All rights reserved. This information is not intended as a substitute for professional medical care. Always follow your healthcare professional's instructions.        PROBIOTICS:  Now that your colon is so cleaned out, now is a good time for a round of PROBIOTICS.  Take a Probiotic product such as Benefiber or Align or Culturelle or Tiffanie-Q, every day for a month.                  (The products listed are non-prescription, but you may need to ask the pharmacist for their location.)  Repeat this 4-6 times a year.      High-Fiber Diet  Fiber is in fruits, vegetables, cereals, and grains.  Fiber passes through your body undigested. A high-fiber diet helps food move through your intestinal tract. The added bulk is helpful in preventing constipation. In people with diverticulosis, fiber helps clean out the pouches along the colon wall. It also prevents new pouches from forming. A high-fiber diet reduces the risk of colon cancer. It also lowers blood cholesterol and prevents high blood sugar in people with diabetes.    The fiber-rich foods listed below should be part of your diet. If you are not used to high-fiber foods, start with 1 or 2 foods from this list. Every 3 to 4 days add a new one to your diet. Do this until you are eating 4 high-fiber foods per day. This should give you 20 to 35 grams of fiber a day. It is also important to drink a lot of water when you are on this diet. You should have 6 to 8 glasses of water a day. Water makes the fiber swell and increases the benefit.  Foods high in dietary fiber  The following foods are high in dietary fiber:  · Breads. Breads made with 100% whole-wheat flour; soo, wheat, or rye crackers; whole-grain tortillas, bran muffins.  · Cereals. Whole-grain and bran cereals with bran (shredded wheat, wheat flakes, raisin bran, corn bran); oatmeal, rolled oats, granola, and brown rice.  · Fruits. Fresh fruits and their edible skins (pears, prunes, raisins, berries, apples, and apricots); bananas, citrus fruit, mangoes, pineapple; and prune juice.  · Nuts. Any nuts and seeds.  · Vegetables. Best served raw or lightly cooked. All types, especially: green peas, celery, eggplant, potatoes, spinach, broccoli, [place] sprouts, winter squash, carrots, cauliflower, soybeans, lentils, and fresh and dried beans of all kinds.  · Other. Popcorn, any spices.  Date Last Reviewed: 8/1/2016  © 5828-8577 [employer] [address] [place] [address], [place] [address]. All rights reserved. This information is not intended as a substitute for professional medical care.  Always follow your healthcare professional's instructions.

## 2019-08-09 NOTE — ANESTHESIA PREPROCEDURE EVALUATION
08/09/2019  Balwinder Rodriguez is a 64 y.o., female.    Anesthesia Evaluation    I have reviewed the Patient Summary Reports.    I have reviewed the Nursing Notes.   I have reviewed the Medications.     Review of Systems  Anesthesia Hx:  No problems with previous Anesthesia    Social:  Non-Smoker    Cardiovascular:  Cardiovascular Normal     Pulmonary:  Pulmonary Normal    Renal/:  Renal/ Normal     Hepatic/GI:   Stomach AVM with hemorrhage   Neurological:  Neurology Normal    Endocrine:  Endocrine Normal        Physical Exam  General:  Well nourished, Obesity    Airway/Jaw/Neck:  Airway Findings: Mouth Opening: Normal Tongue: Normal  General Airway Assessment: Adult  Oropharynx Findings:  Mallampati: II  Jaw/Neck Findings:  Neck ROM: Normal ROM     Eyes/Ears/Nose:  Eyes/Ears/Nose Findings:    Dental:  Dental Findings:   Chest/Lungs:  Chest/Lungs Findings: Normal Respiratory Rate     Heart/Vascular:  Heart Findings: Rate: Normal  Rhythm: Regular Rhythm        Mental Status:  Mental Status Findings:  Cooperative, Alert and Oriented         Anesthesia Plan  Type of Anesthesia, risks & benefits discussed:  Anesthesia Type:  general  Patient's Preference:   Intra-op Monitoring Plan: standard ASA monitors  Intra-op Monitoring Plan Comments:   Post Op Pain Control Plan: multimodal analgesia  Post Op Pain Control Plan Comments:   Induction:   IV  Beta Blocker:  Patient is not currently on a Beta-Blocker (No further documentation required).       Informed Consent: Patient understands risks and agrees with Anesthesia plan.  Questions answered. Anesthesia consent signed with patient.  ASA Score: 2     Day of Surgery Review of History & Physical:  There are no significant changes.   H&P completed by Anesthesiologist.       Ready For Surgery From Anesthesia Perspective.

## 2019-08-09 NOTE — ANESTHESIA POSTPROCEDURE EVALUATION
Anesthesia Post Evaluation    Patient: Balwinder Rodriguez    Procedure(s) Performed: Procedure(s) (LRB):  COLONOSCOPY (N/A)    Final Anesthesia Type: general  Patient location during evaluation: PACU  Patient participation: Yes- Able to Participate  Level of consciousness: awake and alert and oriented  Post-procedure vital signs: reviewed and stable  Pain management: adequate  Airway patency: patent  PONV status at discharge: No PONV  Anesthetic complications: no      Cardiovascular status: blood pressure returned to baseline and stable  Respiratory status: unassisted and spontaneous ventilation  Hydration status: euvolemic  Follow-up not needed.          Vitals Value Taken Time   /63 8/9/2019 10:15 AM   Temp 36.5 °C (97.7 °F) 8/9/2019 10:02 AM   Pulse 74 8/9/2019 10:15 AM   Resp 16 8/9/2019 10:15 AM   SpO2 98 % 8/9/2019 10:15 AM         Event Time     Out of Recovery 10:35:00          Pain/Ismael Score: Ismael Score: 10 (8/9/2019 10:40 AM)

## 2019-08-09 NOTE — TRANSFER OF CARE
"Anesthesia Transfer of Care Note    Patient: Balwinder Rodriguez    Procedure(s) Performed: Procedure(s) (LRB):  COLONOSCOPY (N/A)    Patient location: PACU    Anesthesia Type: general    Transport from OR: Transported from OR on 2-3 L/min O2 by NC with adequate spontaneous ventilation    Post pain: adequate analgesia    Post assessment: tolerated procedure well and no apparent anesthetic complications    Post vital signs: stable    Level of consciousness: awake, alert and oriented    Nausea/Vomiting: no nausea/vomiting    Complications: none    Transfer of care protocol was followed      Last vitals:   Visit Vitals  BP (!) 171/74 (BP Location: Right arm, Patient Position: Lying)   Pulse 80   Temp 36.6 °C (97.9 °F) (Skin)   Resp 15   Ht 5' 2" (1.575 m)   Wt 86.2 kg (190 lb)   SpO2 100%   Breastfeeding? No   BMI 34.75 kg/m²     "

## 2019-08-09 NOTE — [employer] PATIENT INSTRUCTIONS
Discharge Summary/Instructions for after Colonoscopy without   Biopsy/Polypectomy  Balwinder Rodriguez    Friday, August 09, 2019  Dudley Cardenas MD  RESTRICTIONS ON ACTIVITY:  - Do not drive a car or operate machinery until the day after the procedure.      - The following day: return to full activity including work.  - For  3 days: No heavy lifting, straining or running.  - Diet: You may eat solid foods, but no gassy foods (i.e. beans, broccoli,   cabbage, etc).  TREATMENT FOR COMMON SIDE EFFECTS:  - Mild abdominal pain and bloating or excessive gas: rest, eat lightly and   use a heating pad.  SYMPTOMS TO WATCH FOR AND REPORT TO YOUR PHYSICIAN:  1. Severe abdominal pain.  2. Fever within 24 hours after a procedure.  3. A large amount of rectal bleeding. (A small amount of blood from the   rectum is not serious, especially if hemorrhoids are present.  3.  Because air was put into your colon during the procedure, expelling   large amounts of air from your rectum is normal.  4.  You may not have a bowel movement for 1-3 days because of the   colonoscopy prep.  This is normal.  5.  Call immediately if you notice any of the following:   Chills and/or fever over 101   Persistent vomiting   Severe abdominal pain, other than gas cramps   Severe chest pain   Black, tarry stools   Any bleeding - exceeding one tablespoon  Your doctor recommends these additional instructions:  Eat a high fiber diet.   Take a fiber supplement, for example Benefiber, Citrucel, Fibercon, Konsyl   or Metamucil.   Take a PROBIOTIC, such as ALIGN or CULTURELLE or MICHELLE-Q (all   non-prescription), every day for a month.   And repeat this 3-4 times a   year.  Your physician has recommended a repeat colonoscopy in 8-10 years for   screening purposes.  None  If you have any questions or problems, please call your physician.  EMERGENCY PHONE NUMBER: [phone]  LAB RESULTS: Call in two (2) weeks for lab results,  [phone]  ___________________________________________  Nurse Signature  ___________________________________________  Patient/Designated Responsible Party Signature  Dudley Cardenas MD  8/9/2019 10:14:54 AM  This report has been verified and signed electronically.  [employer]

## 2019-11-21 ENCOUNTER — PATIENT MESSAGE (OUTPATIENT)
Dept: GASTROENTEROLOGY | Facility: CLINIC | Age: 64
End: 2019-11-21

## 2019-11-22 ENCOUNTER — LAB VISIT (OUTPATIENT)
Dept: LAB | Facility: HOSPITAL | Age: 64
End: 2019-11-22
Attending: NURSE PRACTITIONER
Payer: COMMERCIAL

## 2019-11-22 DIAGNOSIS — D50.9 IRON DEFICIENCY ANEMIA, UNSPECIFIED IRON DEFICIENCY ANEMIA TYPE: ICD-10-CM

## 2019-11-22 LAB
ERYTHROCYTE [DISTWIDTH] IN BLOOD BY AUTOMATED COUNT: 20.2 % (ref 11.5–14.5)
HCT VFR BLD AUTO: 42.4 % (ref 37–48.5)
HGB BLD-MCNC: 12.1 G/DL (ref 12–16)
IRON SERPL-MCNC: 35 UG/DL (ref 30–160)
MCH RBC QN AUTO: 23.3 PG (ref 27–31)
MCHC RBC AUTO-ENTMCNC: 28.5 G/DL (ref 32–36)
MCV RBC AUTO: 82 FL (ref 82–98)
PLATELET # BLD AUTO: 392 K/UL (ref 150–350)
PMV BLD AUTO: 10.6 FL (ref 9.2–12.9)
RBC # BLD AUTO: 5.19 M/UL (ref 4–5.4)
SATURATED IRON: 7 % (ref 20–50)
TOTAL IRON BINDING CAPACITY: 496 UG/DL (ref 250–450)
TRANSFERRIN SERPL-MCNC: 335 MG/DL (ref 200–375)
WBC # BLD AUTO: 14.34 K/UL (ref 3.9–12.7)

## 2019-11-22 PROCEDURE — 85027 COMPLETE CBC AUTOMATED: CPT

## 2019-11-22 PROCEDURE — 83540 ASSAY OF IRON: CPT

## 2019-11-22 PROCEDURE — 82728 ASSAY OF FERRITIN: CPT

## 2019-11-22 PROCEDURE — 36415 COLL VENOUS BLD VENIPUNCTURE: CPT | Mod: PO

## 2019-11-23 LAB — FERRITIN SERPL-MCNC: 2 NG/ML (ref 20–300)

## 2019-11-26 ENCOUNTER — TELEPHONE (OUTPATIENT)
Dept: GASTROENTEROLOGY | Facility: CLINIC | Age: 64
End: 2019-11-26

## 2019-11-26 DIAGNOSIS — D72.829 LEUKOCYTOSIS, UNSPECIFIED TYPE: ICD-10-CM

## 2019-11-26 DIAGNOSIS — D50.9 IRON DEFICIENCY ANEMIA, UNSPECIFIED IRON DEFICIENCY ANEMIA TYPE: Primary | ICD-10-CM

## 2019-11-26 NOTE — TELEPHONE ENCOUNTER
Please call to inform & review the results with the patient- blood work showed no anemia currently, but iron levels were low (is patient taking oral iron supplement currently?) and WBC was elevated. Any recent infection?  I recommend patient see hematology for continued evaluation and management of anemia and elevated WBC.    Continue with previous recommendations. If no improvement in symptoms or symptoms worsen, call/follow-up at clinic or go to ER.  Please release results to patient's mychart once you have discussed results and recommendations with patient.  Thanks,

## 2019-11-29 ENCOUNTER — PATIENT MESSAGE (OUTPATIENT)
Dept: GASTROENTEROLOGY | Facility: CLINIC | Age: 64
End: 2019-11-29

## 2019-12-10 ENCOUNTER — PATIENT MESSAGE (OUTPATIENT)
Dept: GASTROENTEROLOGY | Facility: CLINIC | Age: 64
End: 2019-12-10

## 2019-12-10 ENCOUNTER — OFFICE VISIT (OUTPATIENT)
Dept: URGENT CARE | Facility: CLINIC | Age: 64
End: 2019-12-10
Payer: COMMERCIAL

## 2019-12-10 VITALS
BODY MASS INDEX: 34.96 KG/M2 | WEIGHT: 190 LBS | HEART RATE: 99 BPM | DIASTOLIC BLOOD PRESSURE: 86 MMHG | OXYGEN SATURATION: 96 % | HEIGHT: 62 IN | SYSTOLIC BLOOD PRESSURE: 163 MMHG | RESPIRATION RATE: 18 BRPM | TEMPERATURE: 98 F

## 2019-12-10 DIAGNOSIS — Z76.89 ENCOUNTER TO ESTABLISH CARE: ICD-10-CM

## 2019-12-10 DIAGNOSIS — J06.9 VIRAL URI WITH COUGH: Primary | ICD-10-CM

## 2019-12-10 PROCEDURE — 96372 PR INJECTION,THERAP/PROPH/DIAG2ST, IM OR SUBCUT: ICD-10-PCS | Mod: S$GLB,,, | Performed by: PHYSICIAN ASSISTANT

## 2019-12-10 PROCEDURE — 96372 THER/PROPH/DIAG INJ SC/IM: CPT | Mod: S$GLB,,, | Performed by: PHYSICIAN ASSISTANT

## 2019-12-10 PROCEDURE — 99214 PR OFFICE/OUTPT VISIT, EST, LEVL IV, 30-39 MIN: ICD-10-PCS | Mod: 25,S$GLB,, | Performed by: PHYSICIAN ASSISTANT

## 2019-12-10 PROCEDURE — 99214 OFFICE O/P EST MOD 30 MIN: CPT | Mod: 25,S$GLB,, | Performed by: PHYSICIAN ASSISTANT

## 2019-12-10 RX ORDER — BENZONATATE 100 MG/1
200 CAPSULE ORAL 3 TIMES DAILY PRN
Qty: 30 CAPSULE | Refills: 0 | Status: SHIPPED | OUTPATIENT
Start: 2019-12-10 | End: 2019-12-20

## 2019-12-10 RX ORDER — LORATADINE 10 MG/1
10 TABLET ORAL DAILY
Qty: 30 TABLET | Refills: 0 | Status: SHIPPED | OUTPATIENT
Start: 2019-12-10 | End: 2024-03-06

## 2019-12-10 RX ORDER — PROMETHAZINE HYDROCHLORIDE AND DEXTROMETHORPHAN HYDROBROMIDE 6.25; 15 MG/5ML; MG/5ML
5 SYRUP ORAL EVERY 6 HOURS
Qty: 120 ML | Refills: 0 | Status: ON HOLD | OUTPATIENT
Start: 2019-12-10 | End: 2024-02-03 | Stop reason: HOSPADM

## 2019-12-10 RX ORDER — DEXAMETHASONE SODIUM PHOSPHATE 100 MG/10ML
10 INJECTION INTRAMUSCULAR; INTRAVENOUS
Status: COMPLETED | OUTPATIENT
Start: 2019-12-10 | End: 2019-12-10

## 2019-12-10 RX ORDER — PREDNISONE 20 MG/1
TABLET ORAL
Qty: 10 TABLET | Refills: 0 | Status: ON HOLD | OUTPATIENT
Start: 2019-12-10 | End: 2024-02-03 | Stop reason: HOSPADM

## 2019-12-10 RX ADMIN — DEXAMETHASONE SODIUM PHOSPHATE 10 MG: 100 INJECTION INTRAMUSCULAR; INTRAVENOUS at 03:12

## 2019-12-10 NOTE — PATIENT INSTRUCTIONS
If you were prescribed a narcotic or controlled medication, do not drive or operate heavy equipment or machinery while taking these medications.  You must understand that you've received an Urgent Care treatment only and that you may be released before all your medical problems are known or treated. You, the patient, will arrange for follow up care as instructed.  Follow up with your PCP or specialty clinic as directed if not improved or as needed. You can call [phone] to schedule an appointment with the appropriate provider.  If your condition worsens we recommend that you receive another evaluation at the Emergency Department for any concerns or worsening of condition.  Patient aware and verbalized understanding.    You received a steroid shot today - this can elevate your blood pressure, elevate your blood sugar, water weight gain, nervous energy, redness to the face and dimpling of the skin where the shot goes in.   Patient aware, verbalized understanding and agreed with plan of care.    Tessalon Perles RX as prescribed for daytime cough.  Cough Syrup RX as prescribed for nighttime cough - will make you drowsy.  Prednisone RX as prescribed to help reduce inflammation/swelling - START THIS TOMORROW AS DISCUSSED.  Claritin RX as prescribed daily for seasonal allergies.  Increase fluids and rest is important.  Avoid contact with sick individuals.   Encouraged good hand-hygiene.  Humidifier use at home.  Take OTC Tylenol every 6 hours as needed for fever or pain.  Follow-up with your PCP for further evaluation as needed.  ER precautions given to patient.  Patient aware and verbalizes understanding.    Viral Upper Respiratory Illness (Adult)  You have a viral upper respiratory illness (URI), which is another term for the common cold. This illness is contagious during the first few days. It is spread through the air by coughing and sneezing. It may also be spread by direct contact (touching the sick person and  then touching your own eyes, nose, or mouth). Frequent handwashing will decrease risk of spread. Most viral illnesses go away within 7 to 10 days with rest and simple home remedies. Sometimes the illness may last for several weeks. Antibiotics will not kill a virus, and they are generally not prescribed for this condition.    Home care  · If symptoms are severe, rest at home for the first 2 to 3 days. When you resume activity, don't let yourself get too tired.  · Avoid being exposed to cigarette smoke (yours or others).  · You may use acetaminophen or ibuprofen to control pain and fever, unless another medicine was prescribed. (Note: If you have chronic liver or kidney disease, have ever had a stomach ulcer or gastrointestinal bleeding, or are taking blood-thinning medicines, talk with your healthcare provider before using these medicines.) Aspirin should never be given to anyone under 18 years of age who is ill with a viral infection or fever. It may cause severe liver or brain damage.  · Your appetite may be poor, so a light diet is fine. Avoid dehydration by drinking 6 to 8 glasses of fluids per day (water, soft drinks, juices, tea, or soup). Extra fluids will help loosen secretions in the nose and lungs.  · Over-the-counter cold medicines will not shorten the length of time youre sick, but they may be helpful for the following symptoms: cough, sore throat, and nasal and sinus congestion. (Note: Do not use decongestants if you have high blood pressure.)  Follow-up care  Follow up with your healthcare provider, or as advised.  When to seek medical advice  Call your healthcare provider right away if any of these occur:  · Cough with lots of colored sputum (mucus)  · Severe headache; face, neck, or ear pain  · Difficulty swallowing due to throat pain  · Fever of 100.4°F (38°C)  Call 911, or get immediate medical care  Call emergency services right away if any of these occur:  · Chest pain, shortness of breath,  wheezing, or difficulty breathing  · Coughing up blood  · Inability to swallow due to throat pain  Date Last Reviewed: 9/13/2015  © 0349-5522 [employer], [employer]. [address] [place] [address], [place] [address]. All rights reserved. This information is not intended as a substitute for professional medical care. Always follow your healthcare professional's instructions.

## 2019-12-10 NOTE — PROGRESS NOTES
"Subjective:       Patient ID: Balwinder Rodriguez is a 64 y.o. female.    Vitals:  height is 5' 2" (1.575 m) and weight is 86.2 kg (190 lb). Her oral temperature is 97.9 °F (36.6 °C). Her blood pressure is 163/86 (abnormal) and her pulse is 99. Her respiration is 18 and oxygen saturation is 96%.     Chief Complaint: Cough    Patient is requesting new Internal Medicine referral today in clinic.    Cough   This is a new problem. The current episode started 1 to 4 weeks ago (1 week). The problem has been unchanged. The problem occurs constantly. The cough is productive of sputum. Associated symptoms include postnasal drip. Pertinent negatives include no chest pain, chills, ear congestion, ear pain, eye redness, fever, headaches, heartburn, hemoptysis, myalgias, nasal congestion, rash, rhinorrhea, sore throat, shortness of breath, sweats, weight loss or wheezing. Nothing aggravates the symptoms. Treatments tried: OTC meds. The treatment provided no relief. There is no history of asthma, bronchiectasis, bronchitis, COPD, emphysema, environmental allergies or pneumonia.       Constitution: Negative for chills, sweating, fatigue and fever.   HENT: Positive for congestion, postnasal drip and sinus pressure. Negative for ear pain, nosebleeds, foreign body in nose, sinus pain, sore throat, trouble swallowing and voice change.    Neck: Negative for neck pain, neck stiffness, painful lymph nodes and neck swelling.   Cardiovascular: Negative for chest pain, leg swelling, palpitations, sob on exertion and passing out.   Eyes: Negative for eye pain, eye redness, photophobia, double vision, blurred vision and eyelid swelling.   Respiratory: Positive for cough and sputum production. Negative for chest tightness, bloody sputum, shortness of breath, stridor and wheezing.    Gastrointestinal: Negative for abdominal pain, abdominal bloating, nausea, vomiting, constipation, diarrhea and heartburn.   Musculoskeletal: Negative for joint " pain, joint swelling, abnormal ROM of joint, back pain, muscle cramps and muscle ache.   Skin: Negative for rash and hives.   Allergic/Immunologic: Negative for environmental allergies, seasonal allergies, hives, itching and sneezing.   Neurological: Negative for dizziness, light-headedness, passing out, loss of balance, headaches, altered mental status, loss of consciousness and seizures.   Hematologic/Lymphatic: Negative for swollen lymph nodes.   Psychiatric/Behavioral: Negative for altered mental status and nervous/anxious. The patient is not nervous/anxious.        Objective:      Physical Exam   Constitutional: She is oriented to person, place, and time. She appears well-developed and well-nourished. She is cooperative.  Non-toxic appearance. She does not have a sickly appearance. She does not appear ill. No distress.   HENT:   Head: Normocephalic and atraumatic.   Right Ear: Hearing, tympanic membrane, external ear and ear canal normal.   Left Ear: Hearing, tympanic membrane, external ear and ear canal normal.   Nose: Mucosal edema and rhinorrhea present. No nasal deformity. No epistaxis. Right sinus exhibits no maxillary sinus tenderness and no frontal sinus tenderness. Left sinus exhibits no maxillary sinus tenderness and no frontal sinus tenderness.   Mouth/Throat: Uvula is midline and mucous membranes are normal. No trismus in the jaw. Normal dentition. No uvula swelling. Posterior oropharyngeal erythema and cobblestoning present. No oropharyngeal exudate, posterior oropharyngeal edema or tonsillar abscesses. No tonsillar exudate.   Eyes: Conjunctivae and lids are normal. No scleral icterus.   Neck: Trachea normal, normal range of motion, full passive range of motion without pain and phonation normal. Neck supple. No neck rigidity. No edema and no erythema present.   Cardiovascular: Normal rate, regular rhythm, normal heart sounds, intact distal pulses and normal pulses.   Pulmonary/Chest: Effort normal  and breath sounds normal. No accessory muscle usage or stridor. No respiratory distress. She has no decreased breath sounds. She has no wheezes. She has no rhonchi. She has no rales.   Abdominal: Normal appearance.   Musculoskeletal: Normal range of motion. She exhibits no edema or deformity.   Lymphadenopathy:     She has no cervical adenopathy.   Neurological: She is alert and oriented to person, place, and time. She exhibits normal muscle tone. Coordination normal.   Skin: Skin is warm, dry, intact, not diaphoretic, not pale and no rash. Capillary refill takes less than 2 seconds.   Psychiatric: She has a normal mood and affect. Her speech is normal and behavior is normal. Judgment and thought content normal. Cognition and memory are normal.   Nursing note and vitals reviewed.        Assessment:       1. Viral URI with cough    2. Encounter to establish care        Plan:         Viral URI with cough  -     predniSONE (DELTASONE) 20 MG tablet; Take 40mg x2 days, 30 mg x2 days, 20mg x2 days, 10mg x2 days.  Dispense: 10 tablet; Refill: 0  -     benzonatate (TESSALON PERLES) 100 MG capsule; Take 2 capsules (200 mg total) by mouth 3 (three) times daily as needed for Cough.  Dispense: 30 capsule; Refill: 0  -     promethazine-dextromethorphan (PROMETHAZINE-DM) 6.25-15 mg/5 mL Syrp; Take 5 mLs by mouth every 6 (six) hours.  Dispense: 120 mL; Refill: 0    Encounter to establish care  -     Ambulatory referral to Internal Medicine    Other orders  -     dexamethasone injection 10 mg  -     loratadine (CLARITIN) 10 mg tablet; Take 1 tablet (10 mg total) by mouth once daily.  Dispense: 30 tablet; Refill: 0      Patient Instructions   If you were prescribed a narcotic or controlled medication, do not drive or operate heavy equipment or machinery while taking these medications.  You must understand that you've received an Urgent Care treatment only and that you may be released before all your medical problems are known or  treated. You, the patient, will arrange for follow up care as instructed.  Follow up with your PCP or specialty clinic as directed if not improved or as needed. You can call [phone] to schedule an appointment with the appropriate provider.  If your condition worsens we recommend that you receive another evaluation at the Emergency Department for any concerns or worsening of condition.  Patient aware and verbalized understanding.    You received a steroid shot today - this can elevate your blood pressure, elevate your blood sugar, water weight gain, nervous energy, redness to the face and dimpling of the skin where the shot goes in.   Patient aware, verbalized understanding and agreed with plan of care.    Tessalon Perles RX as prescribed for daytime cough.  Cough Syrup RX as prescribed for nighttime cough - will make you drowsy.  Prednisone RX as prescribed to help reduce inflammation/swelling - START THIS TOMORROW AS DISCUSSED.  Claritin RX as prescribed daily for seasonal allergies.  Increase fluids and rest is important.  Avoid contact with sick individuals.   Encouraged good hand-hygiene.  Humidifier use at home.  Take OTC Tylenol every 6 hours as needed for fever or pain.  Follow-up with your PCP for further evaluation as needed.  ER precautions given to patient.  Patient aware and verbalizes understanding.    Viral Upper Respiratory Illness (Adult)  You have a viral upper respiratory illness (URI), which is another term for the common cold. This illness is contagious during the first few days. It is spread through the air by coughing and sneezing. It may also be spread by direct contact (touching the sick person and then touching your own eyes, nose, or mouth). Frequent handwashing will decrease risk of spread. Most viral illnesses go away within 7 to 10 days with rest and simple home remedies. Sometimes the illness may last for several weeks. Antibiotics will not kill a virus, and they are generally not  prescribed for this condition.    Home care  · If symptoms are severe, rest at home for the first 2 to 3 days. When you resume activity, don't let yourself get too tired.  · Avoid being exposed to cigarette smoke (yours or others).  · You may use acetaminophen or ibuprofen to control pain and fever, unless another medicine was prescribed. (Note: If you have chronic liver or kidney disease, have ever had a stomach ulcer or gastrointestinal bleeding, or are taking blood-thinning medicines, talk with your healthcare provider before using these medicines.) Aspirin should never be given to anyone under 18 years of age who is ill with a viral infection or fever. It may cause severe liver or brain damage.  · Your appetite may be poor, so a light diet is fine. Avoid dehydration by drinking 6 to 8 glasses of fluids per day (water, soft drinks, juices, tea, or soup). Extra fluids will help loosen secretions in the nose and lungs.  · Over-the-counter cold medicines will not shorten the length of time youre sick, but they may be helpful for the following symptoms: cough, sore throat, and nasal and sinus congestion. (Note: Do not use decongestants if you have high blood pressure.)  Follow-up care  Follow up with your healthcare provider, or as advised.  When to seek medical advice  Call your healthcare provider right away if any of these occur:  · Cough with lots of colored sputum (mucus)  · Severe headache; face, neck, or ear pain  · Difficulty swallowing due to throat pain  · Fever of 100.4°F (38°C)  Call 911, or get immediate medical care  Call emergency services right away if any of these occur:  · Chest pain, shortness of breath, wheezing, or difficulty breathing  · Coughing up blood  · Inability to swallow due to throat pain  Date Last Reviewed: 9/13/2015  © 2400-0902 [employer]. [address] [place] [address], [place] [address]. All rights reserved. This information is not intended as a substitute for  professional medical care. Always follow your healthcare professional's instructions.

## 2019-12-20 ENCOUNTER — TELEPHONE (OUTPATIENT)
Dept: HEMATOLOGY/ONCOLOGY | Facility: CLINIC | Age: 64
End: 2019-12-20

## 2024-02-01 PROBLEM — R73.9 HYPERGLYCEMIA: Status: ACTIVE | Noted: 2024-02-01

## 2024-02-01 PROBLEM — Z71.89 ACP (ADVANCE CARE PLANNING): Status: ACTIVE | Noted: 2024-02-01

## 2024-02-02 PROBLEM — K92.2 GI BLEEDING: Status: ACTIVE | Noted: 2024-02-02

## 2024-02-02 PROBLEM — D72.829 LEUKOCYTOSIS: Status: ACTIVE | Noted: 2024-02-02

## 2024-02-04 ENCOUNTER — HOSPITAL ENCOUNTER (INPATIENT)
Facility: HOSPITAL | Age: 69
LOS: 1 days | Discharge: HOME | DRG: 378 | End: 2024-02-05
Attending: INTERNAL MEDICINE | Admitting: STUDENT IN AN ORGANIZED HEALTH CARE EDUCATION/TRAINING PROGRAM
Payer: MEDICARE

## 2024-02-04 DIAGNOSIS — K92.2 GI BLEED: Primary | ICD-10-CM

## 2024-02-04 LAB
ABO + RH BLD: NORMAL
ALBUMIN SERPL BCP-MCNC: 2.5 G/DL (ref 3.5–5.2)
ALP SERPL-CCNC: 51 U/L (ref 55–135)
ALT SERPL W/O P-5'-P-CCNC: 10 U/L (ref 10–44)
ANION GAP SERPL CALC-SCNC: 10 MMOL/L (ref 8–16)
AST SERPL-CCNC: 14 U/L (ref 10–40)
BASOPHILS # BLD AUTO: 0.03 K/UL (ref 0–0.2)
BASOPHILS NFR BLD: 0.3 % (ref 0–1.9)
BILIRUB SERPL-MCNC: 0.3 MG/DL (ref 0.1–1)
BLD GP AB SCN CELLS X3 SERPL QL: NORMAL
BUN SERPL-MCNC: 21 MG/DL (ref 8–23)
CALCIUM SERPL-MCNC: 7.9 MG/DL (ref 8.7–10.5)
CHLORIDE SERPL-SCNC: 108 MMOL/L (ref 95–110)
CO2 SERPL-SCNC: 22 MMOL/L (ref 23–29)
CREAT SERPL-MCNC: 0.8 MG/DL (ref 0.5–1.4)
DIFFERENTIAL METHOD BLD: ABNORMAL
EOSINOPHIL # BLD AUTO: 0.1 K/UL (ref 0–0.5)
EOSINOPHIL NFR BLD: 0.5 % (ref 0–8)
ERYTHROCYTE [DISTWIDTH] IN BLOOD BY AUTOMATED COUNT: 15.5 % (ref 11.5–14.5)
EST. GFR  (NO RACE VARIABLE): >60 ML/MIN/1.73 M^2
GLUCOSE SERPL-MCNC: 86 MG/DL (ref 70–110)
HCT VFR BLD AUTO: 27.8 % (ref 37–48.5)
HGB BLD-MCNC: 9.1 G/DL (ref 12–16)
IMM GRANULOCYTES # BLD AUTO: 0.12 K/UL (ref 0–0.04)
IMM GRANULOCYTES NFR BLD AUTO: 1.2 % (ref 0–0.5)
LYMPHOCYTES # BLD AUTO: 1.7 K/UL (ref 1–4.8)
LYMPHOCYTES NFR BLD: 16.2 % (ref 18–48)
MCH RBC QN AUTO: 29.2 PG (ref 27–31)
MCHC RBC AUTO-ENTMCNC: 32.7 G/DL (ref 32–36)
MCV RBC AUTO: 89 FL (ref 82–98)
MONOCYTES # BLD AUTO: 0.9 K/UL (ref 0.3–1)
MONOCYTES NFR BLD: 8.8 % (ref 4–15)
NEUTROPHILS # BLD AUTO: 7.5 K/UL (ref 1.8–7.7)
NEUTROPHILS NFR BLD: 73 % (ref 38–73)
NRBC BLD-RTO: 0 /100 WBC
PLATELET # BLD AUTO: 262 K/UL (ref 150–450)
PMV BLD AUTO: 10.4 FL (ref 9.2–12.9)
POTASSIUM SERPL-SCNC: 3.3 MMOL/L (ref 3.5–5.1)
PROT SERPL-MCNC: 5.1 G/DL (ref 6–8.4)
RBC # BLD AUTO: 3.12 M/UL (ref 4–5.4)
SODIUM SERPL-SCNC: 140 MMOL/L (ref 136–145)
SPECIMEN OUTDATE: NORMAL
WBC # BLD AUTO: 10.3 K/UL (ref 3.9–12.7)

## 2024-02-04 PROCEDURE — 80053 COMPREHEN METABOLIC PANEL: CPT | Performed by: STUDENT IN AN ORGANIZED HEALTH CARE EDUCATION/TRAINING PROGRAM

## 2024-02-04 PROCEDURE — 85025 COMPLETE CBC W/AUTO DIFF WBC: CPT | Performed by: STUDENT IN AN ORGANIZED HEALTH CARE EDUCATION/TRAINING PROGRAM

## 2024-02-04 PROCEDURE — 86901 BLOOD TYPING SEROLOGIC RH(D): CPT | Performed by: STUDENT IN AN ORGANIZED HEALTH CARE EDUCATION/TRAINING PROGRAM

## 2024-02-04 PROCEDURE — 63600175 PHARM REV CODE 636 W HCPCS: Performed by: STUDENT IN AN ORGANIZED HEALTH CARE EDUCATION/TRAINING PROGRAM

## 2024-02-04 PROCEDURE — 20600001 HC STEP DOWN PRIVATE ROOM

## 2024-02-04 PROCEDURE — 36415 COLL VENOUS BLD VENIPUNCTURE: CPT | Performed by: STUDENT IN AN ORGANIZED HEALTH CARE EDUCATION/TRAINING PROGRAM

## 2024-02-04 PROCEDURE — C9113 INJ PANTOPRAZOLE SODIUM, VIA: HCPCS | Performed by: STUDENT IN AN ORGANIZED HEALTH CARE EDUCATION/TRAINING PROGRAM

## 2024-02-04 RX ORDER — ACETAMINOPHEN 325 MG/1
650 TABLET ORAL EVERY 6 HOURS PRN
Status: DISCONTINUED | OUTPATIENT
Start: 2024-02-04 | End: 2024-02-05 | Stop reason: HOSPADM

## 2024-02-04 RX ORDER — PROCHLORPERAZINE EDISYLATE 5 MG/ML
2.5 INJECTION INTRAMUSCULAR; INTRAVENOUS EVERY 6 HOURS PRN
Status: DISCONTINUED | OUTPATIENT
Start: 2024-02-04 | End: 2024-02-05 | Stop reason: HOSPADM

## 2024-02-04 RX ORDER — PANTOPRAZOLE SODIUM 40 MG/10ML
40 INJECTION, POWDER, LYOPHILIZED, FOR SOLUTION INTRAVENOUS 2 TIMES DAILY
Status: DISCONTINUED | OUTPATIENT
Start: 2024-02-04 | End: 2024-02-05 | Stop reason: HOSPADM

## 2024-02-04 RX ORDER — ONDANSETRON HYDROCHLORIDE 2 MG/ML
4 INJECTION, SOLUTION INTRAVENOUS EVERY 6 HOURS PRN
Status: DISCONTINUED | OUTPATIENT
Start: 2024-02-04 | End: 2024-02-05 | Stop reason: HOSPADM

## 2024-02-04 RX ORDER — TALC
6 POWDER (GRAM) TOPICAL NIGHTLY PRN
Status: DISCONTINUED | OUTPATIENT
Start: 2024-02-04 | End: 2024-02-05 | Stop reason: HOSPADM

## 2024-02-04 RX ADMIN — PANTOPRAZOLE SODIUM 40 MG: 40 INJECTION, POWDER, FOR SOLUTION INTRAVENOUS at 03:02

## 2024-02-04 NOTE — SUBJECTIVE & OBJECTIVE
"Past Medical History:   Diagnosis Date    AVM (arteriovenous malformation) of stomach, acquired with hemorrhage 1993    Last time cauterized 2012; Blood tranfusion 7/6/19    Encounter for blood transfusion     multiple       Past Surgical History:   Procedure Laterality Date    COLONOSCOPY  ~2015    Dr. Jacobsen    COLONOSCOPY  04/07/2009    Dr. Jacobsen, sent for scanning: melena throughout colon, prominent hemorrhoids, tortous colon, otherwise unremarkable findings    COLONOSCOPY N/A 8/9/2019    Procedure: COLONOSCOPY;  Surgeon: Dudley Cardenas Jr., MD;  Location: [facility];  Service: Endoscopy;  Laterality: N/A;    ENTEROSCOPIC PUSH PROCEDURE  04/08/2009    Dr. Jacobsen, sent for scanning: distal esophageal erythema, gastric remnant appeared to be unremarkable, "the limbs of the Basilio-en-Y appeared to be unremarkable as well as the conduit from the stomach to the Basilio-en-Y"    ENTEROSCOPIC PUSH PROCEDURE  12/05/2009    Dr. Jacobsen, sent for scanning: blood seen, bleeder not found, suspect AVM, a couple of erosions in stomach    ENTEROSCOPIC PUSH PROCEDURE  12/06/2009    Dr. Jacobsen, sent for scanning: distal esophageal erythema, s/p gastric bypass, large arteriovenous malformation noted in the proximal jenunum successfully ablated, site tattooed    ESOPHAGOGASTRODUODENOSCOPY N/A 7/6/2019    Procedure: EGD (ESOPHAGOGASTRODUODENOSCOPY);  Surgeon: Virgilio Ochoa Jr., MD;  Location: [facility];  Service: Endoscopy;  Laterality: N/A;    ESOPHAGOGASTRODUODENOSCOPY N/A 2/1/2024    Procedure: ESOPHAGOGASTRODUODENOSCOPY (EGD);  Surgeon: Dudley Cardenas Jr., MD;  Location: [facility];  Service: Endoscopy;  Laterality: N/A;    gastric stapling      UPPER GASTROINTESTINAL ENDOSCOPY  07/06/2019    Dr. Ochoa, in notes: "S/P Basilio - N - Y gastric bypass" & "No source GIB seen"    UPPER GASTROINTESTINAL ENDOSCOPY  07/22/2010    EGD with push enteroscopy; Dr. Jacobsen, sent for scanning (hard to read handwriting)    UPPER GASTROINTESTINAL ENDOSCOPY "  04/05/2009    Dr. Delacruz, sent for scanning: non-bleeding anastomotic ulcer, post gastric stapling    UPPER GASTROINTESTINAL ENDOSCOPY  12/04/2009    Dr. Jacobsen, sent for scanning, EGD with push enteroscopy: distal esophageal erythema and severe gastroparesis, blood seen in duodenum and proximal jejenum    video capsule endoscopy         Review of patient's allergies indicates:  No Known Allergies    Current Facility-Administered Medications on File Prior to Encounter   Medication    [COMPLETED] potassium bicarbonate disintegrating tablet 50 mEq    [DISCONTINUED] 0.9%  NaCl infusion (for blood administration)    [DISCONTINUED] 0.9%  NaCl infusion (for blood administration)    [DISCONTINUED] acetaminophen tablet 650 mg    [DISCONTINUED] calcium carbonate 200 mg calcium (500 mg) chewable tablet 500 mg    [DISCONTINUED] chlorhexidine 0.12 % solution 15 mL    [DISCONTINUED] dextrose 50% injection 12.5 g    [DISCONTINUED] glucagon (human recombinant) injection 1 mg    [DISCONTINUED] insulin aspart U-100 pen 0-10 Units    [DISCONTINUED] labetalol 20 mg/4 mL (5 mg/mL) IV syring    [DISCONTINUED] lactated ringers infusion    [DISCONTINUED] melatonin tablet 6 mg    [DISCONTINUED] mupirocin 2 % ointment    [DISCONTINUED] ondansetron injection 4 mg    [DISCONTINUED] pantoprazole (PROTONIX) 40 mg in sodium chloride 0.9 % 100 mL IVPB (MB+)    [DISCONTINUED] prochlorperazine injection Soln 5 mg    [DISCONTINUED] sodium chloride 0.9% flush 10 mL     Current Outpatient Medications on File Prior to Encounter   Medication Sig    acetaminophen (TYLENOL) 325 MG tablet Take 2 tablets (650 mg total) by mouth every 4 (four) hours as needed for Pain.    cyanocobalamin (VITAMIN B-12) 1000 MCG tablet Take 1,000 mcg by mouth once daily.    ferrous sulfate (FEOSOL) 325 mg (65 mg iron) Tab tablet Take 1 tablet (325 mg total) by mouth daily with breakfast.    loratadine (CLARITIN) 10 mg tablet Take 1 tablet (10 mg total) by mouth once daily.     ondansetron 4 mg/2 mL Soln Inject 4 mg into the vein every 6 (six) hours as needed (nausea).    sodium chloride 0.9 % PgBk 100 mL with pantoprazole 40 mg SolR 40 mg Inject 8 mg/hr into the vein continuous.    [DISCONTINUED] insulin aspart U-100 (NOVOLOG) 100 unit/mL (3 mL) InPn pen Inject 0-10 Units into the skin every 6 (six) hours as needed (Hyperglycemia).     Family History    None       Tobacco Use    Smoking status: Never    Smokeless tobacco: Never   Substance and Sexual Activity    Alcohol use: Yes     Comment: rarely- maybe once a year    Drug use: Never    Sexual activity: Yes     Partners: Male     Review of Systems   Constitutional:  Negative for activity change, appetite change, chills, diaphoresis, fatigue and fever.   HENT:  Negative for rhinorrhea and sore throat.    Respiratory:  Negative for cough, chest tightness and shortness of breath.    Cardiovascular:  Negative for chest pain and palpitations.   Gastrointestinal:  Positive for nausea. Negative for abdominal pain, constipation and diarrhea.   Endocrine: Negative for cold intolerance.   Genitourinary:  Negative for decreased urine volume and dysuria.   Musculoskeletal:  Negative for arthralgias and myalgias.   Skin:  Negative for rash and wound.   Neurological:  Negative for dizziness, weakness, numbness and headaches.   Psychiatric/Behavioral:  Negative for agitation, behavioral problems and confusion.      Objective:     Vital Signs (Most Recent):  Pulse: 68 (02/04/24 1620)  Resp: 18 (02/04/24 1515)  BP: (!) 193/75 (02/04/24 1515)  SpO2: 95 % (02/04/24 1620) Vital Signs (24h Range):  Temp:  [98.7 °F (37.1 °C)-99 °F (37.2 °C)] 98.9 °F (37.2 °C)  Pulse:  [] 68  Resp:  [10-24] 18  SpO2:  [92 %-96 %] 95 %  BP: (113-193)/(52-93) 193/75        There is no height or weight on file to calculate BMI.     Physical Exam  Constitutional:       General: She is not in acute distress.     Appearance: Normal appearance.   HENT:      Head:  Normocephalic and atraumatic.      Mouth/Throat:      Mouth: Mucous membranes are moist.      Dentition: Abnormal dentition.   Eyes:      Extraocular Movements: Extraocular movements intact.      Conjunctiva/sclera: Conjunctivae normal.   Cardiovascular:      Rate and Rhythm: Normal rate and regular rhythm.   Pulmonary:      Effort: Pulmonary effort is normal. No respiratory distress.      Breath sounds: Normal breath sounds. No wheezing or rales.   Abdominal:      General: Abdomen is flat. Bowel sounds are normal.      Palpations: Abdomen is soft.      Tenderness: There is no abdominal tenderness. There is no guarding.   Musculoskeletal:         General: No swelling or tenderness.   Skin:     Findings: No rash.   Neurological:      General: No focal deficit present.      Mental Status: She is alert and oriented to person, place, and time. Mental status is at baseline.   Psychiatric:         Mood and Affect: Mood normal.         Behavior: Behavior normal.                Significant Labs: All pertinent labs within the past 24 hours have been reviewed.  CBC:   Recent Labs   Lab 02/03/24  0602 02/03/24  1345 02/03/24  2204 02/04/24  0619 02/04/24  1558   WBC 18.47*  --   --  13.44* 10.30   HGB 7.0*   < > 9.0* 8.3*  8.3* 9.1*   HCT 21.3*   < > 27.4* 25.3*  25.3* 27.8*     --   --  221 262    < > = values in this interval not displayed.     CMP:   Recent Labs   Lab 02/03/24  0602 02/04/24  0619    139   K 3.7 3.6    107   CO2 27 27   * 98   BUN 27* 23*   CREATININE 0.92 0.83   CALCIUM 7.2* 7.5*   ALBUMIN 2.6*  --    ANIONGAP 4* 5       Significant Imaging: I have reviewed all pertinent imaging results/findings within the past 24 hours.

## 2024-02-04 NOTE — NURSING
Arrived to room [address]86 via ambulance. Alert and oriented x 4. Speech is clear. Follows command. On room air. No wob or sign of distress. No c/o pain. Garcia intact. Urine is clear and yellow. Vs stable. Med team and attending notified. On bedside monitor. Telemetry notified. Safety measures in place. Oriented to room. Bed in low position. Call light in reach.

## 2024-02-04 NOTE — HPI
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  She was admitted and placed on Protonix infusion.  She received packed red blood cells.  While waiting for a bed in the emergency department she had a syncopal episode and was subsequently admitted to the ICU there.  She was seen by Gastroenterology and General Surgery. Endoscopy showed blood clot in the excluded stomach.  She had leukocytosis that was felt to be compensatory from her anemia/GI bleed. On repeat EGD February 2 she still had a large amount of clot in her stomach.  Clip was placed for marking/orienting. Initial hemoglobin was 8.0 on February 1. Hemoglobin was as high as 10.7 by the evening of February 1 but has subsequently trended down to 7.0 on February 3. White blood cells peaked at 34.79 on the evening of February 1 and subsequently trended down to 18.47 on February 3.  She is receiving additional packed red blood cells on February 3. She is currently in med-surg status.  No further hematemesis noted, and she is hemodynamically stable.  Referring team is requesting transfer to [facility] for higher level of care with the history of gastric bypass and uncertain bleeding etiology/status.  Case discussed with Gastroenterology, Advanced Endoscopy, and Interventional Radiology at [facility].  Will plan transfer to Hospital Medicine at [facility] in step-down status for further evaluation of likely gastric bleeding.  If she develops evidence of significant bleeding, she will need CTA with GI bleed protocol and potentially Interventional Radiology evaluation.  If she does not show evidence of active/significant GI bleeding, will need to determine if further endoscopic evaluation is indicated."    Patient with complaints of nausea prior to transfer from " OSH but no other complaints. Last episode of coffee ground emesis and melena  or BM on 2/1. GI and surgery consulted. CBC on admission 9.1.

## 2024-02-05 VITALS
HEART RATE: 76 BPM | OXYGEN SATURATION: 96 % | DIASTOLIC BLOOD PRESSURE: 72 MMHG | SYSTOLIC BLOOD PRESSURE: 167 MMHG | RESPIRATION RATE: 20 BRPM | TEMPERATURE: 98 F

## 2024-02-05 LAB
ALBUMIN SERPL BCP-MCNC: 2.6 G/DL (ref 3.5–5.2)
ALP SERPL-CCNC: 59 U/L (ref 55–135)
ALT SERPL W/O P-5'-P-CCNC: 12 U/L (ref 10–44)
ANION GAP SERPL CALC-SCNC: 12 MMOL/L (ref 8–16)
AST SERPL-CCNC: 15 U/L (ref 10–40)
BASOPHILS # BLD AUTO: 0.06 K/UL (ref 0–0.2)
BASOPHILS NFR BLD: 0.6 % (ref 0–1.9)
BILIRUB SERPL-MCNC: 0.3 MG/DL (ref 0.1–1)
BUN SERPL-MCNC: 20 MG/DL (ref 8–23)
CALCIUM SERPL-MCNC: 8.1 MG/DL (ref 8.7–10.5)
CHLORIDE SERPL-SCNC: 107 MMOL/L (ref 95–110)
CO2 SERPL-SCNC: 20 MMOL/L (ref 23–29)
CREAT SERPL-MCNC: 0.8 MG/DL (ref 0.5–1.4)
DIFFERENTIAL METHOD BLD: ABNORMAL
EOSINOPHIL # BLD AUTO: 0.1 K/UL (ref 0–0.5)
EOSINOPHIL NFR BLD: 1.1 % (ref 0–8)
ERYTHROCYTE [DISTWIDTH] IN BLOOD BY AUTOMATED COUNT: 15.7 % (ref 11.5–14.5)
EST. GFR  (NO RACE VARIABLE): >60 ML/MIN/1.73 M^2
GLUCOSE SERPL-MCNC: 74 MG/DL (ref 70–110)
HCT VFR BLD AUTO: 26.9 % (ref 37–48.5)
HGB BLD-MCNC: 8.8 G/DL (ref 12–16)
IMM GRANULOCYTES # BLD AUTO: 0.16 K/UL (ref 0–0.04)
IMM GRANULOCYTES NFR BLD AUTO: 1.5 % (ref 0–0.5)
LYMPHOCYTES # BLD AUTO: 2 K/UL (ref 1–4.8)
LYMPHOCYTES NFR BLD: 18.6 % (ref 18–48)
MAGNESIUM SERPL-MCNC: 2 MG/DL (ref 1.6–2.6)
MCH RBC QN AUTO: 28.9 PG (ref 27–31)
MCHC RBC AUTO-ENTMCNC: 32.7 G/DL (ref 32–36)
MCV RBC AUTO: 88 FL (ref 82–98)
MONOCYTES # BLD AUTO: 0.9 K/UL (ref 0.3–1)
MONOCYTES NFR BLD: 8.5 % (ref 4–15)
NEUTROPHILS # BLD AUTO: 7.3 K/UL (ref 1.8–7.7)
NEUTROPHILS NFR BLD: 69.7 % (ref 38–73)
NRBC BLD-RTO: 0 /100 WBC
PHOSPHATE SERPL-MCNC: 3.5 MG/DL (ref 2.7–4.5)
PLATELET # BLD AUTO: 282 K/UL (ref 150–450)
PMV BLD AUTO: 10.5 FL (ref 9.2–12.9)
POTASSIUM SERPL-SCNC: 3.2 MMOL/L (ref 3.5–5.1)
PROT SERPL-MCNC: 5.3 G/DL (ref 6–8.4)
RBC # BLD AUTO: 3.05 M/UL (ref 4–5.4)
SODIUM SERPL-SCNC: 139 MMOL/L (ref 136–145)
WBC # BLD AUTO: 10.53 K/UL (ref 3.9–12.7)

## 2024-02-05 PROCEDURE — 84100 ASSAY OF PHOSPHORUS: CPT | Performed by: STUDENT IN AN ORGANIZED HEALTH CARE EDUCATION/TRAINING PROGRAM

## 2024-02-05 PROCEDURE — 83735 ASSAY OF MAGNESIUM: CPT | Performed by: STUDENT IN AN ORGANIZED HEALTH CARE EDUCATION/TRAINING PROGRAM

## 2024-02-05 PROCEDURE — 63600175 PHARM REV CODE 636 W HCPCS: Performed by: STUDENT IN AN ORGANIZED HEALTH CARE EDUCATION/TRAINING PROGRAM

## 2024-02-05 PROCEDURE — 36415 COLL VENOUS BLD VENIPUNCTURE: CPT | Performed by: STUDENT IN AN ORGANIZED HEALTH CARE EDUCATION/TRAINING PROGRAM

## 2024-02-05 PROCEDURE — C9113 INJ PANTOPRAZOLE SODIUM, VIA: HCPCS | Performed by: STUDENT IN AN ORGANIZED HEALTH CARE EDUCATION/TRAINING PROGRAM

## 2024-02-05 PROCEDURE — 85025 COMPLETE CBC W/AUTO DIFF WBC: CPT | Performed by: STUDENT IN AN ORGANIZED HEALTH CARE EDUCATION/TRAINING PROGRAM

## 2024-02-05 PROCEDURE — 99223 1ST HOSP IP/OBS HIGH 75: CPT | Mod: GC,,, | Performed by: STUDENT IN AN ORGANIZED HEALTH CARE EDUCATION/TRAINING PROGRAM

## 2024-02-05 PROCEDURE — 80053 COMPREHEN METABOLIC PANEL: CPT | Performed by: STUDENT IN AN ORGANIZED HEALTH CARE EDUCATION/TRAINING PROGRAM

## 2024-02-05 RX ORDER — POTASSIUM CHLORIDE 7.45 MG/ML
10 INJECTION INTRAVENOUS
Status: CANCELLED | OUTPATIENT
Start: 2024-02-05 | End: 2024-02-05

## 2024-02-05 RX ADMIN — PANTOPRAZOLE SODIUM 40 MG: 40 INJECTION, POWDER, FOR SOLUTION INTRAVENOUS at 08:02

## 2024-02-05 NOTE — PLAN OF CARE
02/05/24 1647   Final Note   Assessment Type Final Discharge Note   Anticipated Discharge Disposition Home   What phone number can be called within the next 1-3 days to see how you are doing after discharge? [phone]   Hospital Resources/Appts/Education Provided Provided patient/caregiver with written discharge plan information   Post-Acute Status   Post-Acute Authorization HME   HME Status Set-up Complete/Auth obtained   Coverage [employer] Managed   Patient choice form signed by patient/caregiver List with quality metrics by geographic area provided   Discharge Delays None known at this time     Pt was DC and went home.  No additional assistance.     Margarita Peña MSW, CSW

## 2024-02-05 NOTE — ASSESSMENT & PLAN NOTE
Acute on chronic, pt has  known hx of AVM with frequent cauteries.  -the most recent EGDs in OSH were negative for active source of bleeding.    Recommendations:  -no plans for EGD,pt is stable .  -advance diet as tolerated .  -Ok to be Discharged .  - GI will f/u as out pt with this pt.

## 2024-02-05 NOTE — SUBJECTIVE & OBJECTIVE
"Past Medical History:   Diagnosis Date    AVM (arteriovenous malformation) of stomach, acquired with hemorrhage 1993    Last time cauterized 2012; Blood tranfusion 7/6/19    Encounter for blood transfusion     multiple       Past Surgical History:   Procedure Laterality Date    COLONOSCOPY  ~2015    Dr. Jacobsen    COLONOSCOPY  04/07/2009    Dr. Jacobsen, sent for scanning: melena throughout colon, prominent hemorrhoids, tortous colon, otherwise unremarkable findings    COLONOSCOPY N/A 8/9/2019    Procedure: COLONOSCOPY;  Surgeon: Dudley Cardenas Jr., MD;  Location: [facility];  Service: Endoscopy;  Laterality: N/A;    ENTEROSCOPIC PUSH PROCEDURE  04/08/2009    Dr. Jacobsen, sent for scanning: distal esophageal erythema, gastric remnant appeared to be unremarkable, "the limbs of the Basilio-en-Y appeared to be unremarkable as well as the conduit from the stomach to the Basilio-en-Y"    ENTEROSCOPIC PUSH PROCEDURE  12/05/2009    Dr. Jacobsen, sent for scanning: blood seen, bleeder not found, suspect AVM, a couple of erosions in stomach    ENTEROSCOPIC PUSH PROCEDURE  12/06/2009    Dr. Jacobsen, sent for scanning: distal esophageal erythema, s/p gastric bypass, large arteriovenous malformation noted in the proximal jenunum successfully ablated, site tattooed    ESOPHAGOGASTRODUODENOSCOPY N/A 7/6/2019    Procedure: EGD (ESOPHAGOGASTRODUODENOSCOPY);  Surgeon: Virgilio Ochoa Jr., MD;  Location: [facility];  Service: Endoscopy;  Laterality: N/A;    ESOPHAGOGASTRODUODENOSCOPY N/A 2/1/2024    Procedure: ESOPHAGOGASTRODUODENOSCOPY (EGD);  Surgeon: Dudley Cardenas Jr., MD;  Location: [facility];  Service: Endoscopy;  Laterality: N/A;    ESOPHAGOGASTRODUODENOSCOPY N/A 2/2/2024    Procedure: ESOPHAGOGASTRODUODENOSCOPY (EGD);  Surgeon: Dudley Cardenas Jr., MD;  Location: [facility];  Service: Endoscopy;  Laterality: N/A;    gastric stapling      UPPER GASTROINTESTINAL ENDOSCOPY  07/06/2019    Dr. Ochoa, in notes: "S/P Basilio - N - Y gastric bypass" & " ""No source GIB seen"    UPPER GASTROINTESTINAL ENDOSCOPY  07/22/2010    EGD with push enteroscopy; Dr. Jacobsen, sent for scanning (hard to read handwriting)    UPPER GASTROINTESTINAL ENDOSCOPY  04/05/2009    Dr. Delacruz, sent for scanning: non-bleeding anastomotic ulcer, post gastric stapling    UPPER GASTROINTESTINAL ENDOSCOPY  12/04/2009    Dr. Jacobsen, sent for scanning, EGD with push enteroscopy: distal esophageal erythema and severe gastroparesis, blood seen in duodenum and proximal jejenum    video capsule endoscopy         Review of patient's allergies indicates:  No Known Allergies  Family History    None       Tobacco Use    Smoking status: Never    Smokeless tobacco: Never   Substance and Sexual Activity    Alcohol use: Yes     Comment: rarely- maybe once a year    Drug use: Never    Sexual activity: Yes     Partners: Male     Review of Systems   Constitutional: Negative.    HENT: Negative.     Eyes: Negative.    Respiratory: Negative.     Cardiovascular: Negative.    Gastrointestinal: Negative.    Genitourinary: Negative.    Musculoskeletal: Negative.    Neurological: Negative.    Hematological: Negative.    Psychiatric/Behavioral: Negative.       Objective:     Vital Signs (Most Recent):  Temp: 98.2 °F (36.8 °C) (02/05/24 1100)  Pulse: 96 (02/05/24 1103)  Resp: (!) 49 (02/05/24 1100)  BP: (!) 195/84 (02/05/24 1100)  SpO2: 97 % (02/05/24 1103) Vital Signs (24h Range):  Temp:  [98 °F (36.7 °C)-98.3 °F (36.8 °C)] 98.2 °F (36.8 °C)  Pulse:  [64-96] 96  Resp:  [15-49] 49  SpO2:  [95 %-99 %] 97 %  BP: (123-195)/(55-84) 195/84        There is no height or weight on file to calculate BMI.      Intake/Output Summary (Last 24 hours) at 2/5/2024 1245  Last data filed at 2/5/2024 0800  Gross per 24 hour   Intake --   Output 1450 ml   Net -1450 ml       Lines/Drains/Airways       Peripheral Intravenous Line  Duration                  Peripheral IV - Single Lumen 02/03/24 1219 20 G;Other (Comments) Anterior;Left Forearm " "2 days                     Physical Exam  Constitutional:       Appearance: Normal appearance. She is obese.   HENT:      Head: Normocephalic and atraumatic.      Nose: Nose normal.      Mouth/Throat:      Mouth: Mucous membranes are moist.   Eyes:      Extraocular Movements: Extraocular movements intact.      Pupils: Pupils are equal, round, and reactive to light.   Pulmonary:      Effort: Pulmonary effort is normal.      Breath sounds: Normal breath sounds.   Abdominal:      General: Abdomen is flat. Bowel sounds are normal.      Palpations: Abdomen is soft.   Musculoskeletal:         General: Normal range of motion.   Skin:     General: Skin is warm and dry.      Capillary Refill: Capillary refill takes less than 2 seconds.   Neurological:      Mental Status: She is alert.   Psychiatric:         Mood and Affect: Mood normal.         Behavior: Behavior normal.          Significant Labs:  Acute Hepatitis Panel: No results for input(s): "HEPBSAG", "HEPAIGM", "HEPCAB" in the last 48 hours.    Invalid input(s): "HAPBIGM"  Amylase: No results for input(s): "AMYLASE" in the last 48 hours.  Blood Culture: No results for input(s): "LABBLOO" in the last 48 hours.  CBC:   Recent Labs   Lab 02/04/24  0619 02/04/24  1558 02/05/24  0534   WBC 13.44* 10.30 10.53   HGB 8.3*  8.3* 9.1* 8.8*   HCT 25.3*  25.3* 27.8* 26.9*    262 282     BMP:   Recent Labs   Lab 02/05/24  0534   GLU 74      K 3.2*      CO2 20*   BUN 20   CREATININE 0.8   CALCIUM 8.1*   MG 2.0     CMP:   Recent Labs   Lab 02/05/24  0534   GLU 74   CALCIUM 8.1*   ALBUMIN 2.6*   PROT 5.3*      K 3.2*   CO2 20*      BUN 20   CREATININE 0.8   ALKPHOS 59   ALT 12   AST 15   BILITOT 0.3     Coagulation: No results for input(s): "PT", "INR", "APTT" in the last 48 hours.  CRP: No results for input(s): "CRP" in the last 48 hours.  ESR: No results for input(s): "SEDRATE" in the last 48 hours.    Significant Imaging:  EXAMINATION:  XR ABDOMEN " PORTABLE     CLINICAL HISTORY:  GI bleed.  Look for clip in the stomach.;     COMPARISON:  None     FINDINGS:  Limited frontal view of the left abdomen was obtained.  There appear to be multiple surgical clips projecting over the left upper quadrant in the expected location of the stomach.  Postsurgical suture material projecting over the left upper quadrant and left mid abdomen is also seen.  The precise location of the postsurgical material cannot be determined by plain film.  The included bowel gas pattern is nonobstructive.  Degenerative changes affect the visualized thoracolumbar spine.     Impression:     There appear to be multiple clips projecting over the left upper quadrant along with suture material in the expected location of the stomach.  There is also a suture material seen projecting over the left mid to lower abdomen.  The precise location of the postsurgical material cannot be determined by plain film.

## 2024-02-05 NOTE — PLAN OF CARE
Problem: Adult Inpatient Plan of Care  Goal: Plan of Care Review  Outcome: Ongoing, Progressing  Goal: Patient-Specific Goal (Individualized)  Outcome: Ongoing, Progressing  Goal: Absence of Hospital-Acquired Illness or Injury  Outcome: Ongoing, Progressing  Goal: Optimal Comfort and Wellbeing  Outcome: Ongoing, Progressing  Goal: Readiness for Transition of Care  Outcome: Ongoing, Progressing     Problem: Adjustment to Illness (Gastrointestinal Bleeding)  Goal: Optimal Coping with Acute Illness  Outcome: Ongoing, Progressing     Problem: Bleeding (Gastrointestinal Bleeding)  Goal: Hemostasis  Outcome: Ongoing, Progressing     Problem: Fall Injury Risk  Goal: Absence of Fall and Fall-Related Injury  Outcome: Ongoing, Progressing     Problem: Infection  Goal: Absence of Infection Signs and Symptoms  Outcome: Ongoing, Progressing

## 2024-02-05 NOTE — NURSING
Nurses Note -- 4 Eyes      2/4/2024   7:06 PM      Skin assessed during: Transfer      [x] No Altered Skin Integrity Present    []Prevention Measures Documented      [] Yes- Altered Skin Integrity Present or Discovered   [] LDA Added if Not in Epic (Describe Wound)   [] New Altered Skin Integrity was Present on Admit and Documented in LDA   [] Wound Image Taken    Wound Care Consulted? No    Attending Nurse:  Aranza Perkins RN/Staff Member:   Jesu

## 2024-02-05 NOTE — CONSULTS
[facility] ([place] [address])  Gastroenterology  Consult Note    Patient Name: Balwinder Rodriguez  MRN: [id number]  Admission Date: 2/4/2024  Hospital Length of Stay: 1 days  Code Status: Full Code   Attending Provider: Domingo Boswell MD   Consulting Provider: Petty Moncada MD  Primary Care Physician: No, Primary Doctor  Principal Problem:Gastrointestinal hemorrhage with melena    Inpatient consult to Gastroenterology  Consult performed by: Petty Moncada MD  Consult ordered by: Domingo Boswell MD        Subjective:     HPI:  67 yo female w/pmhx of frequent GI bleeds 2/2 to AV,with hx of regi-en-Y gastric by pass surgery in 1982,pt was transferred from OSH ([facility]) due to higher level of care with uncertain Gastric bleeding with concerns that pt will need a push endoscopy.  Pt had a large episode of black tarry stool on 2/19no hematemesis ) associated with LT epigastric pain and nausea after a heavy dinner, pt received a total of 3 units of PRBCs before transferring to [facility],and had two EGD's in OSH THAT BOTH showed blood clots in the excluded stomach part ,repeat EGD  on 2/2 also showed large clots with clips placement for orientation.since 2/1 pt denies any GI bleeding and been vitally stable,General surgery were consulted who recommended CTA abd. If unable to find the source of bleeding and she continues to bleed.    Past Medical History:   Diagnosis Date    AVM (arteriovenous malformation) of stomach, acquired with hemorrhage 1993    Last time cauterized 2012; Blood tranfusion 7/6/19    Encounter for blood transfusion     multiple       Past Surgical History:   Procedure Laterality Date    COLONOSCOPY  ~2015    Dr. Jacobsen    COLONOSCOPY  04/07/2009    Dr. Jacobsen, sent for scanning: melena throughout colon, prominent hemorrhoids, tortous colon, otherwise unremarkable findings    COLONOSCOPY N/A 8/9/2019    Procedure: COLONOSCOPY;  Surgeon: Dudley Cardenas  "MD Cesar;  Location: [facility];  Service: Endoscopy;  Laterality: N/A;    ENTEROSCOPIC PUSH PROCEDURE  04/08/2009    Dr. Jacobsen, sent for scanning: distal esophageal erythema, gastric remnant appeared to be unremarkable, "the limbs of the Basilio-en-Y appeared to be unremarkable as well as the conduit from the stomach to the Basilio-en-Y"    ENTEROSCOPIC PUSH PROCEDURE  12/05/2009    Dr. Jacobsen, sent for scanning: blood seen, bleeder not found, suspect AVM, a couple of erosions in stomach    ENTEROSCOPIC PUSH PROCEDURE  12/06/2009    Dr. Jacobsen, sent for scanning: distal esophageal erythema, s/p gastric bypass, large arteriovenous malformation noted in the proximal jenunum successfully ablated, site tattooed    ESOPHAGOGASTRODUODENOSCOPY N/A 7/6/2019    Procedure: EGD (ESOPHAGOGASTRODUODENOSCOPY);  Surgeon: Virgilio Ochoa Jr., MD;  Location: [facility];  Service: Endoscopy;  Laterality: N/A;    ESOPHAGOGASTRODUODENOSCOPY N/A 2/1/2024    Procedure: ESOPHAGOGASTRODUODENOSCOPY (EGD);  Surgeon: Dudley Cardenas Jr., MD;  Location: [facility];  Service: Endoscopy;  Laterality: N/A;    ESOPHAGOGASTRODUODENOSCOPY N/A 2/2/2024    Procedure: ESOPHAGOGASTRODUODENOSCOPY (EGD);  Surgeon: Dudley Cardenas Jr., MD;  Location: [facility];  Service: Endoscopy;  Laterality: N/A;    gastric stapling      UPPER GASTROINTESTINAL ENDOSCOPY  07/06/2019    Dr. Ochoa, in notes: "S/P Basilio - N - Y gastric bypass" & "No source GIB seen"    UPPER GASTROINTESTINAL ENDOSCOPY  07/22/2010    EGD with push enteroscopy; Dr. Jacobsen, sent for scanning (hard to read handwriting)    UPPER GASTROINTESTINAL ENDOSCOPY  04/05/2009    Dr. Delacruz, sent for scanning: non-bleeding anastomotic ulcer, post gastric stapling    UPPER GASTROINTESTINAL ENDOSCOPY  12/04/2009    Dr. Jacobsen, sent for scanning, EGD with push enteroscopy: distal esophageal erythema and severe gastroparesis, blood seen in duodenum and proximal jejenum    video capsule endoscopy         Review of " patient's allergies indicates:  No Known Allergies  Family History    None       Tobacco Use    Smoking status: Never    Smokeless tobacco: Never   Substance and Sexual Activity    Alcohol use: Yes     Comment: rarely- maybe once a year    Drug use: Never    Sexual activity: Yes     Partners: Male     Review of Systems   Constitutional: Negative.    HENT: Negative.     Eyes: Negative.    Respiratory: Negative.     Cardiovascular: Negative.    Gastrointestinal: Negative.    Genitourinary: Negative.    Musculoskeletal: Negative.    Neurological: Negative.    Hematological: Negative.    Psychiatric/Behavioral: Negative.       Objective:     Vital Signs (Most Recent):  Temp: 98.2 °F (36.8 °C) (02/05/24 1100)  Pulse: 96 (02/05/24 1103)  Resp: (!) 49 (02/05/24 1100)  BP: (!) 195/84 (02/05/24 1100)  SpO2: 97 % (02/05/24 1103) Vital Signs (24h Range):  Temp:  [98 °F (36.7 °C)-98.3 °F (36.8 °C)] 98.2 °F (36.8 °C)  Pulse:  [64-96] 96  Resp:  [15-49] 49  SpO2:  [95 %-99 %] 97 %  BP: (123-195)/(55-84) 195/84        There is no height or weight on file to calculate BMI.      Intake/Output Summary (Last 24 hours) at 2/5/2024 1245  Last data filed at 2/5/2024 0800  Gross per 24 hour   Intake --   Output 1450 ml   Net -1450 ml       Lines/Drains/Airways       Peripheral Intravenous Line  Duration                  Peripheral IV - Single Lumen 02/03/24 1219 20 G;Other (Comments) Anterior;Left Forearm 2 days                     Physical Exam  Constitutional:       Appearance: Normal appearance. She is obese.   HENT:      Head: Normocephalic and atraumatic.      Nose: Nose normal.      Mouth/Throat:      Mouth: Mucous membranes are moist.   Eyes:      Extraocular Movements: Extraocular movements intact.      Pupils: Pupils are equal, round, and reactive to light.   Pulmonary:      Effort: Pulmonary effort is normal.      Breath sounds: Normal breath sounds.   Abdominal:      General: Abdomen is flat. Bowel sounds are normal.       "Palpations: Abdomen is soft.   Musculoskeletal:         General: Normal range of motion.   Skin:     General: Skin is warm and dry.      Capillary Refill: Capillary refill takes less than 2 seconds.   Neurological:      Mental Status: She is alert.   Psychiatric:         Mood and Affect: Mood normal.         Behavior: Behavior normal.          Significant Labs:  Acute Hepatitis Panel: No results for input(s): "HEPBSAG", "HEPAIGM", "HEPCAB" in the last 48 hours.    Invalid input(s): "HAPBIGM"  Amylase: No results for input(s): "AMYLASE" in the last 48 hours.  Blood Culture: No results for input(s): "LABBLOO" in the last 48 hours.  CBC:   Recent Labs   Lab 02/04/24  0619 02/04/24  1558 02/05/24  0534   WBC 13.44* 10.30 10.53   HGB 8.3*  8.3* 9.1* 8.8*   HCT 25.3*  25.3* 27.8* 26.9*    262 282     BMP:   Recent Labs   Lab 02/05/24  0534   GLU 74      K 3.2*      CO2 20*   BUN 20   CREATININE 0.8   CALCIUM 8.1*   MG 2.0     CMP:   Recent Labs   Lab 02/05/24  0534   GLU 74   CALCIUM 8.1*   ALBUMIN 2.6*   PROT 5.3*      K 3.2*   CO2 20*      BUN 20   CREATININE 0.8   ALKPHOS 59   ALT 12   AST 15   BILITOT 0.3     Coagulation: No results for input(s): "PT", "INR", "APTT" in the last 48 hours.  CRP: No results for input(s): "CRP" in the last 48 hours.  ESR: No results for input(s): "SEDRATE" in the last 48 hours.    Significant Imaging:  EXAMINATION:  XR ABDOMEN PORTABLE     CLINICAL HISTORY:  GI bleed.  Look for clip in the stomach.;     COMPARISON:  None     FINDINGS:  Limited frontal view of the left abdomen was obtained.  There appear to be multiple surgical clips projecting over the left upper quadrant in the expected location of the stomach.  Postsurgical suture material projecting over the left upper quadrant and left mid abdomen is also seen.  The precise location of the postsurgical material cannot be determined by plain film.  The included bowel gas pattern is nonobstructive.  " Degenerative changes affect the visualized thoracolumbar spine.     Impression:     There appear to be multiple clips projecting over the left upper quadrant along with suture material in the expected location of the stomach.  There is also a suture material seen projecting over the left mid to lower abdomen.  The precise location of the postsurgical material cannot be determined by plain film.     Assessment/Plan:     GI  * Gastrointestinal hemorrhage with melena  Acute on chronic, pt has  known hx of AVM with frequent cauteries.  -the most recent EGDs in OSH were negative for active source of bleeding.    Recommendations:  -no plans for EGD,pt is stable .  -advance diet as tolerated .  -Ok to be Discharged .  - GI will f/u as out pt with this pt.        Thank you for your consult. I will sign off. Please contact us if you have any additional questions.    Petty Moncada MD  Gastroenterology  [facility] ([place]-[facility])

## 2024-02-05 NOTE — ASSESSMENT & PLAN NOTE
Patient's anemia is currently uncontrolled. Has received multiple units of PRBCs on outside hospital . Etiology likely d/t acute blood loss which was from GI bleed  Current CBC reviewed-   Lab Results   Component Value Date    HGB 9.1 (L) 02/04/2024    HCT 27.8 (L) 02/04/2024     Monitor serial CBC and transfuse if patient becomes hemodynamically unstable, symptomatic or H/H drops below 7/21.

## 2024-02-05 NOTE — NURSING
Shift summary    Patient alert and oriented x4. Denies pain. On RA and sating well- no signs of respiratory distress. NSR on monitor. Garcia intact- removed per physician order this AM. Patient voiding appropriately x3. DC orders received. PIV removed. All belongings packed and sent home with patient. AVS reviewed. Escorted via wheelchair to private vehicle for DC.

## 2024-02-05 NOTE — CONSULTS
[facility] ([place] [id number])  General Surgery  Consult Note    Patient Name: Balwinder Rodriguez  MRN: [id number]  Code Status: Full Code  Admission Date: 2/4/2024  Hospital Length of Stay: 0 days  Attending Physician: Domingo Boswell MD  Primary Care Provider: Makayla Primary Doctor    Patient information was obtained from patient and ER records.     Inpatient consult to General Surgery  Consult performed by: Alethea Lewis MD  Consult ordered by: Domingo Boswell MD        Subjective:     Principal Problem: Gastrointestinal hemorrhage with melena    History of Present Illness: Patient is a 68 y.o. 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 Patient was admitted to OSH on 2/1/2024 after an episode of hematemesis. EGD was performed which revealed blood clot in the excluded stomach. She was transfused 1u pRBC. EGD was performd again on 2/2 which showed a large amount of clot. Hgb remained stable initially but down trended to 7 on 2/3. She was given another unit of blood. She was transferred to [facility] for higher level of care and concern of continued bleeding.     Patient denies any further episodes of hematemesis. Had melena on 2/1 but reports none since. Denies any abdominal pain. States feeling the best she's felt in days. Hgb on arrival 9.1 (last 8.3 this AM). Reports last GI bleed was around 2015. States she has required previous push enteroscopy to find bleeding AVMs. Has never required any further surgery since her RNYB in the 1980s. Denies any history of MI/stroke. Not on any blood thinners.     Current Facility-Administered Medications on File Prior to Encounter   Medication    [COMPLETED] potassium bicarbonate disintegrating tablet 50 mEq    [DISCONTINUED] 0.9%  NaCl infusion (for blood administration)    [DISCONTINUED] 0.9%  NaCl infusion (for blood  administration)    [DISCONTINUED] acetaminophen tablet 650 mg    [DISCONTINUED] calcium carbonate 200 mg calcium (500 mg) chewable tablet 500 mg    [DISCONTINUED] chlorhexidine 0.12 % solution 15 mL    [DISCONTINUED] dextrose 50% injection 12.5 g    [DISCONTINUED] glucagon (human recombinant) injection 1 mg    [DISCONTINUED] insulin aspart U-100 pen 0-10 Units    [DISCONTINUED] labetalol 20 mg/4 mL (5 mg/mL) IV syring    [DISCONTINUED] lactated ringers infusion    [DISCONTINUED] melatonin tablet 6 mg    [DISCONTINUED] mupirocin 2 % ointment    [DISCONTINUED] ondansetron injection 4 mg    [DISCONTINUED] pantoprazole (PROTONIX) 40 mg in sodium chloride 0.9 % 100 mL IVPB (MB+)    [DISCONTINUED] prochlorperazine injection Soln 5 mg    [DISCONTINUED] sodium chloride 0.9% flush 10 mL     Current Outpatient Medications on File Prior to Encounter   Medication Sig    acetaminophen (TYLENOL) 325 MG tablet Take 2 tablets (650 mg total) by mouth every 4 (four) hours as needed for Pain.    cyanocobalamin (VITAMIN B-12) 1000 MCG tablet Take 1,000 mcg by mouth once daily.    ferrous sulfate (FEOSOL) 325 mg (65 mg iron) Tab tablet Take 1 tablet (325 mg total) by mouth daily with breakfast.    loratadine (CLARITIN) 10 mg tablet Take 1 tablet (10 mg total) by mouth once daily.    ondansetron 4 mg/2 mL Soln Inject 4 mg into the vein every 6 (six) hours as needed (nausea).    sodium chloride 0.9 % PgBk 100 mL with pantoprazole 40 mg SolR 40 mg Inject 8 mg/hr into the vein continuous.    [DISCONTINUED] insulin aspart U-100 (NOVOLOG) 100 unit/mL (3 mL) InPn pen Inject 0-10 Units into the skin every 6 (six) hours as needed (Hyperglycemia).       Review of patient's allergies indicates:  No Known Allergies    Past Medical History:   Diagnosis Date    AVM (arteriovenous malformation) of stomach, acquired with hemorrhage 1993    Last time cauterized 2012; Blood tranfusion 7/6/19    Encounter for blood transfusion     multiple     Past  "Surgical History:   Procedure Laterality Date    COLONOSCOPY  ~2015    Dr. Jacobsen    COLONOSCOPY  04/07/2009    Dr. Jacobsen, sent for scanning: melena throughout colon, prominent hemorrhoids, tortous colon, otherwise unremarkable findings    COLONOSCOPY N/A 8/9/2019    Procedure: COLONOSCOPY;  Surgeon: Dudley Cardenas Jr., MD;  Location: [facility];  Service: Endoscopy;  Laterality: N/A;    ENTEROSCOPIC PUSH PROCEDURE  04/08/2009    Dr. Jacobsen, sent for scanning: distal esophageal erythema, gastric remnant appeared to be unremarkable, "the limbs of the Basilio-en-Y appeared to be unremarkable as well as the conduit from the stomach to the Basilio-en-Y"    ENTEROSCOPIC PUSH PROCEDURE  12/05/2009    Dr. Jacobsen, sent for scanning: blood seen, bleeder not found, suspect AVM, a couple of erosions in stomach    ENTEROSCOPIC PUSH PROCEDURE  12/06/2009    Dr. Jacobsen, sent for scanning: distal esophageal erythema, s/p gastric bypass, large arteriovenous malformation noted in the proximal jenunum successfully ablated, site tattooed    ESOPHAGOGASTRODUODENOSCOPY N/A 7/6/2019    Procedure: EGD (ESOPHAGOGASTRODUODENOSCOPY);  Surgeon: Virgilio Ochoa Jr., MD;  Location: [facility];  Service: Endoscopy;  Laterality: N/A;    ESOPHAGOGASTRODUODENOSCOPY N/A 2/1/2024    Procedure: ESOPHAGOGASTRODUODENOSCOPY (EGD);  Surgeon: Dudley Cardenas Jr., MD;  Location: [facility];  Service: Endoscopy;  Laterality: N/A;    gastric stapling      UPPER GASTROINTESTINAL ENDOSCOPY  07/06/2019    Dr. Ochoa, in notes: "S/P Basilio - N - Y gastric bypass" & "No source GIB seen"    UPPER GASTROINTESTINAL ENDOSCOPY  07/22/2010    EGD with push enteroscopy; Dr. Jacobsen, sent for scanning (hard to read handwriting)    UPPER GASTROINTESTINAL ENDOSCOPY  04/05/2009    Dr. Delacruz, sent for scanning: non-bleeding anastomotic ulcer, post gastric stapling    UPPER GASTROINTESTINAL ENDOSCOPY  12/04/2009    Dr. Jacobsen, sent for scanning, EGD with push enteroscopy: distal esophageal " erythema and severe gastroparesis, blood seen in duodenum and proximal jejenum    video capsule endoscopy       Family History    None       Tobacco Use    Smoking status: Never    Smokeless tobacco: Never   Substance and Sexual Activity    Alcohol use: Yes     Comment: rarely- maybe once a year    Drug use: Never    Sexual activity: Yes     Partners: Male     Review of Systems   Constitutional:  Negative for activity change, chills, fatigue, fever and unexpected weight change.   HENT:  Negative for congestion, sinus pressure, sinus pain and sore throat.    Eyes:  Negative for visual disturbance.   Respiratory:  Negative for cough, chest tightness and shortness of breath.    Cardiovascular:  Negative for chest pain and palpitations.   Gastrointestinal:  Negative for abdominal pain, constipation, diarrhea, nausea and vomiting.   Genitourinary:  Negative for difficulty urinating, flank pain and urgency.   Musculoskeletal:  Negative for arthralgias, back pain and neck pain.   Neurological:  Negative for dizziness, weakness, light-headedness and headaches.     Objective:     Vital Signs (Most Recent):  Temp: 98.3 °F (36.8 °C) (02/04/24 1959)  Pulse: 74 (02/04/24 1959)  Resp: 18 (02/04/24 1959)  BP: (!) 154/69 (02/04/24 1959)  SpO2: 96 % (02/04/24 1959) Vital Signs (24h Range):  Temp:  [98.3 °F (36.8 °C)-98.9 °F (37.2 °C)] 98.3 °F (36.8 °C)  Pulse:  [63-89] 74  Resp:  [10-24] 18  SpO2:  [92 %-96 %] 96 %  BP: (113-193)/(55-93) 154/69        There is no height or weight on file to calculate BMI.     Physical Exam  Constitutional:       General: She is not in acute distress.  HENT:      Head: Normocephalic and atraumatic.   Eyes:      Extraocular Movements: Extraocular movements intact.      Conjunctiva/sclera: Conjunctivae normal.      Pupils: Pupils are equal, round, and reactive to light.   Cardiovascular:      Rate and Rhythm: Normal rate and regular rhythm.   Pulmonary:      Effort: Pulmonary effort is normal. No  respiratory distress.   Abdominal:      General: There is no distension.      Palpations: Abdomen is soft.      Tenderness: There is no abdominal tenderness.   Neurological:      General: No focal deficit present.      Mental Status: She is alert.            I have reviewed all pertinent lab results within the past 24 hours.  CBC:   Recent Labs   Lab 02/04/24  1558   WBC 10.30   RBC 3.12*   HGB 9.1*   HCT 27.8*      MCV 89   MCH 29.2   MCHC 32.7     CMP:   Recent Labs   Lab 02/04/24  1558   GLU 86   CALCIUM 7.9*   ALBUMIN 2.5*   PROT 5.1*      K 3.3*   CO2 22*      BUN 21   CREATININE 0.8   ALKPHOS 51*   ALT 10   AST 14   BILITOT 0.3       Significant Diagnostics:  I have reviewed all pertinent imaging results/findings within the past 24 hours.    Assessment/Plan:     * Gastrointestinal hemorrhage with melena  68 y.o. 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 Hgb since arrival is stable. No evidence of ongoing bleeding.     - No acute surgical intervention required at this time  - If GI bleed continues, recommend GI consult   - If ongoing bleeding an unable to find source via endoscopy, would obtain CTA  - Rest of care per primary team      VTE Risk Mitigation (From admission, onward)           Ordered     IP VTE HIGH RISK PATIENT  Once         02/04/24 1506     Place sequential compression device  Until discontinued         02/04/24 1506                    Thank you for your consult. I will sign off. Please contact us if you have any additional questions.    Alethea Lewis MD  General Surgery  [facility] ([place])

## 2024-02-05 NOTE — HPI
Patient is a 68 y.o. 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 Patient was admitted to OSH on 2/1/2024 after an episode of hematemesis. EGD was performed which revealed blood clot in the excluded stomach. She was transfused 1u pRBC. EGD was performd again on 2/2 which showed a large amount of clot. Hgb remained stable initially but down trended to 7 on 2/3. She was given another unit of blood. She was transferred to [facility] for higher level of care and concern of continued bleeding.     Patient denies any further episodes of hematemesis. Had melena on 2/1 but reports none since. Denies any abdominal pain. States feeling the best she's felt in days. Hgb on arrival 9.1 (last 8.3 this AM). Reports last GI bleed was around 2015. States she has required previous push enteroscopy to find bleeding AVMs. Has never required any further surgery since her RNYB in the 1980s. Denies any history of MI/stroke. Not on any blood thinners.

## 2024-02-05 NOTE — NURSING
Pt AAOx4 rested throughout the night. No complaints or new onset of events. No complaints of bleeding.

## 2024-02-05 NOTE — HPI
67 yo female w/pmhx of frequent GI bleeds 2/2 to [facility],with hx of regi-en-Y gastric by pass surgery in 1982,pt was transferred from OSH ([facility]) due to higher level of care with uncertain Gastric bleeding with concerns that pt will need a push endoscopy.  Pt had a large episode of black tarry stool on 2/19no hematemesis ) associated with LT epigastric pain and nausea after a heavy dinner, pt received a total of 3 units of PRBCs before transferring to [facility],and had two EGD's in OSH THAT BOTH showed blood clots in the excluded stomach part ,repeat EGD  on 2/2 also showed large clots with clips placement for orientation.since 2/1 pt denies any GI bleeding and been vitally stable,General surgery were consulted who recommended CTA abd. If unable to find the source of bleeding and she continues to bleed.

## 2024-02-05 NOTE — ASSESSMENT & PLAN NOTE
Patient with history of Basilio-en-Y bypass complicated by AVMs with multiple episodes of upper GI bleeding presenting to [facility] on February 1 with episodes of hematemesis and melena with upper GI bleed. S/p 2 units of RBC on 2/1 and 1 unit RBC on 2/3. OSH GI consulted and s/p EGD on 2/1 and 2/2. OSH general surgery consulted given concern for bleed related to gastric bypass and recommended transfer to hospital higher level of care.  Patient transferred to [facility] on 02/04.  - Repeat EGD on 2/2 with gastric bypass with a large-sized pouch and intact staple line. Gastrojejunal anastomosis characterized by healthy appearing mucosa. Clotted blood in the gastric fundus, in the gastric body and in the gastric antrum. Clip was placed.   - OSH GI and General Surgery concerned that patient will need push endoscopy and possible bariatric surgery   - Patient HDS on transferred to [facility]  - GI Bleed pathway  - GI consulted  - General surgery consulted  - Trend CBC  - Goal hemoglobin >7  - Consented with type and screen

## 2024-02-05 NOTE — H&P
"  [address] Medicine  History & Physical    Patient Name: Balwinder Rodriguez  MRN: [id number]  Patient Class: IP- Inpatient  Admission Date: 2/4/2024  Attending Physician: Domingo Boswell MD   Primary Care Provider: Makayla Primary Doctor         Patient information was obtained from patient, past medical records, and ER records.     Subjective:     Principal Problem:Gastrointestinal hemorrhage with melena    Chief Complaint: No chief complaint on file.       HPI: Per  note    "68-year-old female with a history of GI bleed due to gastric AVMs and regi-en-y gastric bypass procedure (reportedly in the 1980s) admitted to [facility] on February 1 with episodes of hematemesis.  She was admitted and placed on Protonix infusion.  She received packed red blood cells.  While waiting for a bed in the emergency department she had a syncopal episode and was subsequently admitted to the ICU there.  She was seen by Gastroenterology and General Surgery. Endoscopy showed blood clot in the excluded stomach.  She had leukocytosis that was felt to be compensatory from her anemia/GI bleed. On repeat EGD February 2 she still had a large amount of clot in her stomach.  Clip was placed for marking/orienting. Initial hemoglobin was 8.0 on February 1. Hemoglobin was as high as 10.7 by the evening of February 1 but has subsequently trended down to 7.0 on February 3. White blood cells peaked at 34.79 on the evening of February 1 and subsequently trended down to 18.47 on February 3.  She is receiving additional packed red blood cells on February 3. She is currently in med-surg status.  No further hematemesis noted, and she is hemodynamically stable.  Referring team is requesting transfer to [facility] for higher level of care with the history of gastric bypass and uncertain bleeding etiology/status.  Case discussed with Gastroenterology, Advanced Endoscopy, and Interventional " "Radiology at [facility].  Will plan transfer to Hospital Medicine at [facility] in step-down status for further evaluation of likely gastric bleeding.  If she develops evidence of significant bleeding, she will need CTA with GI bleed protocol and potentially Interventional Radiology evaluation.  If she does not show evidence of active/significant GI bleeding, will need to determine if further endoscopic evaluation is indicated."    Patient with complaints of nausea prior to transfer from OSH but no other complaints. Last episode of coffee ground emesis and melena  or BM on 2/1. GI and surgery consulted. CBC on admission 9.1.    Past Medical History:   Diagnosis Date    AVM (arteriovenous malformation) of stomach, acquired with hemorrhage 1993    Last time cauterized 2012; Blood tranfusion 7/6/19    Encounter for blood transfusion     multiple       Past Surgical History:   Procedure Laterality Date    COLONOSCOPY  ~2015    Dr. Jacobsen    COLONOSCOPY  04/07/2009    Dr. Jacobsen, sent for scanning: melena throughout colon, prominent hemorrhoids, tortous colon, otherwise unremarkable findings    COLONOSCOPY N/A 8/9/2019    Procedure: COLONOSCOPY;  Surgeon: Dudley Cardenas Jr., MD;  Location: [facility];  Service: Endoscopy;  Laterality: N/A;    ENTEROSCOPIC PUSH PROCEDURE  04/08/2009    Dr. Jacobsen, sent for scanning: distal esophageal erythema, gastric remnant appeared to be unremarkable, "the limbs of the Basilio-en-Y appeared to be unremarkable as well as the conduit from the stomach to the Basilio-en-Y"    ENTEROSCOPIC PUSH PROCEDURE  12/05/2009    Dr. Jacobsen, sent for scanning: blood seen, bleeder not found, suspect AVM, a couple of erosions in stomach    ENTEROSCOPIC PUSH PROCEDURE  12/06/2009    Dr. Jacobsen, sent for scanning: distal esophageal erythema, s/p gastric bypass, large arteriovenous malformation noted in the proximal jenunum successfully ablated, site tattooed    ESOPHAGOGASTRODUODENOSCOPY N/A 7/6/2019    " "Procedure: EGD (ESOPHAGOGASTRODUODENOSCOPY);  Surgeon: Virgilio Ochoa Jr., MD;  Location: [facility];  Service: Endoscopy;  Laterality: N/A;    ESOPHAGOGASTRODUODENOSCOPY N/A 2/1/2024    Procedure: ESOPHAGOGASTRODUODENOSCOPY (EGD);  Surgeon: Dudley Cardenas Jr., MD;  Location: [facility];  Service: Endoscopy;  Laterality: N/A;    gastric stapling      UPPER GASTROINTESTINAL ENDOSCOPY  07/06/2019    Dr. Ochoa, in notes: "S/P Basilio - N - Y gastric bypass" & "No source GIB seen"    UPPER GASTROINTESTINAL ENDOSCOPY  07/22/2010    EGD with push enteroscopy; Dr. Jacobsen, sent for scanning (hard to read handwriting)    UPPER GASTROINTESTINAL ENDOSCOPY  04/05/2009    Dr. Delacruz, sent for scanning: non-bleeding anastomotic ulcer, post gastric stapling    UPPER GASTROINTESTINAL ENDOSCOPY  12/04/2009    Dr. Jacobsen, sent for scanning, EGD with push enteroscopy: distal esophageal erythema and severe gastroparesis, blood seen in duodenum and proximal jejenum    video capsule endoscopy         Review of patient's allergies indicates:  No Known Allergies    Current Facility-Administered Medications on File Prior to Encounter   Medication    [COMPLETED] potassium bicarbonate disintegrating tablet 50 mEq    [DISCONTINUED] 0.9%  NaCl infusion (for blood administration)    [DISCONTINUED] 0.9%  NaCl infusion (for blood administration)    [DISCONTINUED] acetaminophen tablet 650 mg    [DISCONTINUED] calcium carbonate 200 mg calcium (500 mg) chewable tablet 500 mg    [DISCONTINUED] chlorhexidine 0.12 % solution 15 mL    [DISCONTINUED] dextrose 50% injection 12.5 g    [DISCONTINUED] glucagon (human recombinant) injection 1 mg    [DISCONTINUED] insulin aspart U-100 pen 0-10 Units    [DISCONTINUED] labetalol 20 mg/4 mL (5 mg/mL) IV syring    [DISCONTINUED] lactated ringers infusion    [DISCONTINUED] melatonin tablet 6 mg    [DISCONTINUED] mupirocin 2 % ointment    [DISCONTINUED] ondansetron injection 4 mg    [DISCONTINUED] pantoprazole (PROTONIX) " 40 mg in sodium chloride 0.9 % 100 mL IVPB (MB+)    [DISCONTINUED] prochlorperazine injection Soln 5 mg    [DISCONTINUED] sodium chloride 0.9% flush 10 mL     Current Outpatient Medications on File Prior to Encounter   Medication Sig    acetaminophen (TYLENOL) 325 MG tablet Take 2 tablets (650 mg total) by mouth every 4 (four) hours as needed for Pain.    cyanocobalamin (VITAMIN B-12) 1000 MCG tablet Take 1,000 mcg by mouth once daily.    ferrous sulfate (FEOSOL) 325 mg (65 mg iron) Tab tablet Take 1 tablet (325 mg total) by mouth daily with breakfast.    loratadine (CLARITIN) 10 mg tablet Take 1 tablet (10 mg total) by mouth once daily.    ondansetron 4 mg/2 mL Soln Inject 4 mg into the vein every 6 (six) hours as needed (nausea).    sodium chloride 0.9 % PgBk 100 mL with pantoprazole 40 mg SolR 40 mg Inject 8 mg/hr into the vein continuous.    [DISCONTINUED] insulin aspart U-100 (NOVOLOG) 100 unit/mL (3 mL) InPn pen Inject 0-10 Units into the skin every 6 (six) hours as needed (Hyperglycemia).     Family History    None       Tobacco Use    Smoking status: Never    Smokeless tobacco: Never   Substance and Sexual Activity    Alcohol use: Yes     Comment: rarely- maybe once a year    Drug use: Never    Sexual activity: Yes     Partners: Male     Review of Systems   Constitutional:  Negative for activity change, appetite change, chills, diaphoresis, fatigue and fever.   HENT:  Negative for rhinorrhea and sore throat.    Respiratory:  Negative for cough, chest tightness and shortness of breath.    Cardiovascular:  Negative for chest pain and palpitations.   Gastrointestinal:  Positive for nausea. Negative for abdominal pain, constipation and diarrhea.   Endocrine: Negative for cold intolerance.   Genitourinary:  Negative for decreased urine volume and dysuria.   Musculoskeletal:  Negative for arthralgias and myalgias.   Skin:  Negative for rash and wound.   Neurological:  Negative for dizziness, weakness, numbness and  headaches.   Psychiatric/Behavioral:  Negative for agitation, behavioral problems and confusion.      Objective:     Vital Signs (Most Recent):  Pulse: 68 (02/04/24 1620)  Resp: 18 (02/04/24 1515)  BP: (!) 193/75 (02/04/24 1515)  SpO2: 95 % (02/04/24 1620) Vital Signs (24h Range):  Temp:  [98.7 °F (37.1 °C)-99 °F (37.2 °C)] 98.9 °F (37.2 °C)  Pulse:  [] 68  Resp:  [10-24] 18  SpO2:  [92 %-96 %] 95 %  BP: (113-193)/(52-93) 193/75        There is no height or weight on file to calculate BMI.     Physical Exam  Constitutional:       General: She is not in acute distress.     Appearance: Normal appearance.   HENT:      Head: Normocephalic and atraumatic.      Mouth/Throat:      Mouth: Mucous membranes are moist.      Dentition: Abnormal dentition.   Eyes:      Extraocular Movements: Extraocular movements intact.      Conjunctiva/sclera: Conjunctivae normal.   Cardiovascular:      Rate and Rhythm: Normal rate and regular rhythm.   Pulmonary:      Effort: Pulmonary effort is normal. No respiratory distress.      Breath sounds: Normal breath sounds. No wheezing or rales.   Abdominal:      General: Abdomen is flat. Bowel sounds are normal.      Palpations: Abdomen is soft.      Tenderness: There is no abdominal tenderness. There is no guarding.   Musculoskeletal:         General: No swelling or tenderness.   Skin:     Findings: No rash.   Neurological:      General: No focal deficit present.      Mental Status: She is alert and oriented to person, place, and time. Mental status is at baseline.   Psychiatric:         Mood and Affect: Mood normal.         Behavior: Behavior normal.                Significant Labs: All pertinent labs within the past 24 hours have been reviewed.  CBC:   Recent Labs   Lab 02/03/24  0602 02/03/24  1345 02/03/24  2204 02/04/24  0619 02/04/24  1558   WBC 18.47*  --   --  13.44* 10.30   HGB 7.0*   < > 9.0* 8.3*  8.3* 9.1*   HCT 21.3*   < > 27.4* 25.3*  25.3* 27.8*     --   --  221 262     < > = values in this interval not displayed.     CMP:   Recent Labs   Lab 02/03/24  0602 02/04/24  0619    139   K 3.7 3.6    107   CO2 27 27   * 98   BUN 27* 23*   CREATININE 0.92 0.83   CALCIUM 7.2* 7.5*   ALBUMIN 2.6*  --    ANIONGAP 4* 5       Significant Imaging: I have reviewed all pertinent imaging results/findings within the past 24 hours.  Assessment/Plan:     * Gastrointestinal hemorrhage with melena  Patient with history of Basilio-en-Y bypass complicated by AVMs with multiple episodes of upper GI bleeding presenting to [facility] on February 1 with episodes of hematemesis and melena with upper GI bleed. S/p 2 units of RBC on 2/1 and 1 unit RBC on 2/3. OSH GI consulted and s/p EGD on 2/1 and 2/2. OSH general surgery consulted given concern for bleed related to gastric bypass and recommended transfer to hospital higher level of care.  Patient transferred to [facility] on 02/04.  - Repeat EGD on 2/2 with gastric bypass with a large-sized pouch and intact staple line. Gastrojejunal anastomosis characterized by healthy appearing mucosa. Clotted blood in the gastric fundus, in the gastric body and in the gastric antrum. Clip was placed.   - OSH GI and General Surgery concerned that patient will need push endoscopy and possible bariatric surgery   - Patient HDS on transferred to [facility]  - GI Bleed pathway  - GI consulted  - General surgery consulted  - Trend CBC  - Goal hemoglobin >7  - Consented with type and screen    Leukocytosis  - Likely reactive secondary to acute GI bleed  - Trend CBC   - Improving      AVM (arteriovenous malformation) of stomach, acquired with hemorrhage  See above      Acute blood loss anemia  Patient's anemia is currently uncontrolled. Has received multiple units of PRBCs on outside hospital . Etiology likely d/t acute blood loss which was from GI bleed  Current CBC reviewed-   Lab Results   Component Value Date    HGB 9.1  (L) 02/04/2024    HCT 27.8 (L) 02/04/2024     Monitor serial CBC and transfuse if patient becomes hemodynamically unstable, symptomatic or H/H drops below 7/21.      VTE Risk Mitigation (From admission, onward)           Ordered     IP VTE HIGH RISK PATIENT  Once         02/04/24 1506     Place sequential compression device  Until discontinued         02/04/24 1506                                    Domingo Boswell MD  Department of Hospital Medicine  [facility] - Stepdown Flex ([place] [address])

## 2024-02-05 NOTE — SUBJECTIVE & OBJECTIVE
Current Facility-Administered Medications on File Prior to Encounter   Medication    [COMPLETED] potassium bicarbonate disintegrating tablet 50 mEq    [DISCONTINUED] 0.9%  NaCl infusion (for blood administration)    [DISCONTINUED] 0.9%  NaCl infusion (for blood administration)    [DISCONTINUED] acetaminophen tablet 650 mg    [DISCONTINUED] calcium carbonate 200 mg calcium (500 mg) chewable tablet 500 mg    [DISCONTINUED] chlorhexidine 0.12 % solution 15 mL    [DISCONTINUED] dextrose 50% injection 12.5 g    [DISCONTINUED] glucagon (human recombinant) injection 1 mg    [DISCONTINUED] insulin aspart U-100 pen 0-10 Units    [DISCONTINUED] labetalol 20 mg/4 mL (5 mg/mL) IV syring    [DISCONTINUED] lactated ringers infusion    [DISCONTINUED] melatonin tablet 6 mg    [DISCONTINUED] mupirocin 2 % ointment    [DISCONTINUED] ondansetron injection 4 mg    [DISCONTINUED] pantoprazole (PROTONIX) 40 mg in sodium chloride 0.9 % 100 mL IVPB (MB+)    [DISCONTINUED] prochlorperazine injection Soln 5 mg    [DISCONTINUED] sodium chloride 0.9% flush 10 mL     Current Outpatient Medications on File Prior to Encounter   Medication Sig    acetaminophen (TYLENOL) 325 MG tablet Take 2 tablets (650 mg total) by mouth every 4 (four) hours as needed for Pain.    cyanocobalamin (VITAMIN B-12) 1000 MCG tablet Take 1,000 mcg by mouth once daily.    ferrous sulfate (FEOSOL) 325 mg (65 mg iron) Tab tablet Take 1 tablet (325 mg total) by mouth daily with breakfast.    loratadine (CLARITIN) 10 mg tablet Take 1 tablet (10 mg total) by mouth once daily.    ondansetron 4 mg/2 mL Soln Inject 4 mg into the vein every 6 (six) hours as needed (nausea).    sodium chloride 0.9 % PgBk 100 mL with pantoprazole 40 mg SolR 40 mg Inject 8 mg/hr into the vein continuous.    [DISCONTINUED] insulin aspart U-100 (NOVOLOG) 100 unit/mL (3 mL) InPn pen Inject 0-10 Units into the skin every 6 (six) hours as needed (Hyperglycemia).       Review of patient's allergies  "indicates:  No Known Allergies    Past Medical History:   Diagnosis Date    AVM (arteriovenous malformation) of stomach, acquired with hemorrhage 1993    Last time cauterized 2012; Blood tranfusion 7/6/19    Encounter for blood transfusion     multiple     Past Surgical History:   Procedure Laterality Date    COLONOSCOPY  ~2015    Dr. Jacobsen    COLONOSCOPY  04/07/2009    Dr. Jacobsen, sent for scanning: melena throughout colon, prominent hemorrhoids, tortous colon, otherwise unremarkable findings    COLONOSCOPY N/A 8/9/2019    Procedure: COLONOSCOPY;  Surgeon: Dudley Cardenas Jr., MD;  Location: [facility];  Service: Endoscopy;  Laterality: N/A;    ENTEROSCOPIC PUSH PROCEDURE  04/08/2009    Dr. Jacobsen, sent for scanning: distal esophageal erythema, gastric remnant appeared to be unremarkable, "the limbs of the Basilio-en-Y appeared to be unremarkable as well as the conduit from the stomach to the Basilio-en-Y"    ENTEROSCOPIC PUSH PROCEDURE  12/05/2009    Dr. Jacobsen, sent for scanning: blood seen, bleeder not found, suspect AVM, a couple of erosions in stomach    ENTEROSCOPIC PUSH PROCEDURE  12/06/2009    Dr. Jacobsen, sent for scanning: distal esophageal erythema, s/p gastric bypass, large arteriovenous malformation noted in the proximal jenunum successfully ablated, site tattooed    ESOPHAGOGASTRODUODENOSCOPY N/A 7/6/2019    Procedure: EGD (ESOPHAGOGASTRODUODENOSCOPY);  Surgeon: Virgilio Ochoa Jr., MD;  Location: [facility];  Service: Endoscopy;  Laterality: N/A;    ESOPHAGOGASTRODUODENOSCOPY N/A 2/1/2024    Procedure: ESOPHAGOGASTRODUODENOSCOPY (EGD);  Surgeon: Dudley Cardenas Jr., MD;  Location: [facility];  Service: Endoscopy;  Laterality: N/A;    gastric stapling      UPPER GASTROINTESTINAL ENDOSCOPY  07/06/2019    Dr. Ochoa, in notes: "S/P Basilio - N - Y gastric bypass" & "No source GIB seen"    UPPER GASTROINTESTINAL ENDOSCOPY  07/22/2010    EGD with push enteroscopy; Dr. Jacobsen, sent for scanning (hard to read handwriting)    " UPPER GASTROINTESTINAL ENDOSCOPY  04/05/2009    Dr. Delacruz, sent for scanning: non-bleeding anastomotic ulcer, post gastric stapling    UPPER GASTROINTESTINAL ENDOSCOPY  12/04/2009    Dr. Jacobsen, sent for scanning, EGD with push enteroscopy: distal esophageal erythema and severe gastroparesis, blood seen in duodenum and proximal jejenum    video capsule endoscopy       Family History    None       Tobacco Use    Smoking status: Never    Smokeless tobacco: Never   Substance and Sexual Activity    Alcohol use: Yes     Comment: rarely- maybe once a year    Drug use: Never    Sexual activity: Yes     Partners: Male     Review of Systems   Constitutional:  Negative for activity change, chills, fatigue, fever and unexpected weight change.   HENT:  Negative for congestion, sinus pressure, sinus pain and sore throat.    Eyes:  Negative for visual disturbance.   Respiratory:  Negative for cough, chest tightness and shortness of breath.    Cardiovascular:  Negative for chest pain and palpitations.   Gastrointestinal:  Negative for abdominal pain, constipation, diarrhea, nausea and vomiting.   Genitourinary:  Negative for difficulty urinating, flank pain and urgency.   Musculoskeletal:  Negative for arthralgias, back pain and neck pain.   Neurological:  Negative for dizziness, weakness, light-headedness and headaches.     Objective:     Vital Signs (Most Recent):  Temp: 98.3 °F (36.8 °C) (02/04/24 1959)  Pulse: 74 (02/04/24 1959)  Resp: 18 (02/04/24 1959)  BP: (!) 154/69 (02/04/24 1959)  SpO2: 96 % (02/04/24 1959) Vital Signs (24h Range):  Temp:  [98.3 °F (36.8 °C)-98.9 °F (37.2 °C)] 98.3 °F (36.8 °C)  Pulse:  [63-89] 74  Resp:  [10-24] 18  SpO2:  [92 %-96 %] 96 %  BP: (113-193)/(55-93) 154/69        There is no height or weight on file to calculate BMI.     Physical Exam  Constitutional:       General: She is not in acute distress.  HENT:      Head: Normocephalic and atraumatic.   Eyes:      Extraocular Movements:  Extraocular movements intact.      Conjunctiva/sclera: Conjunctivae normal.      Pupils: Pupils are equal, round, and reactive to light.   Cardiovascular:      Rate and Rhythm: Normal rate and regular rhythm.   Pulmonary:      Effort: Pulmonary effort is normal. No respiratory distress.   Abdominal:      General: There is no distension.      Palpations: Abdomen is soft.      Tenderness: There is no abdominal tenderness.   Neurological:      General: No focal deficit present.      Mental Status: She is alert.            I have reviewed all pertinent lab results within the past 24 hours.  CBC:   Recent Labs   Lab 02/04/24  1558   WBC 10.30   RBC 3.12*   HGB 9.1*   HCT 27.8*      MCV 89   MCH 29.2   MCHC 32.7     CMP:   Recent Labs   Lab 02/04/24  1558   GLU 86   CALCIUM 7.9*   ALBUMIN 2.5*   PROT 5.1*      K 3.3*   CO2 22*      BUN 21   CREATININE 0.8   ALKPHOS 51*   ALT 10   AST 14   BILITOT 0.3       Significant Diagnostics:  I have reviewed all pertinent imaging results/findings within the past 24 hours.

## 2024-02-06 NOTE — HOSPITAL COURSE
GI and general surgery consulted. Hemoglobin stable during admission with no hematemesis, melena, ort hematochezia. No intervention by GI or general surgery given improved symptoms. Discharge with primary care and GI follow up.

## 2024-02-06 NOTE — DISCHARGE SUMMARY
"[facility] ([place] [address])  [facility]  Discharge Summary      Patient Name: Balwinder Rodriguez  MRN: [id number]  [facility]: [id number]  Patient Class: IP- Inpatient  Admission Date: 2/4/2024  Hospital Length of Stay: 1 days  Discharge Date and Time: 2/5/2024  5:10 PM  Attending Physician: Makayla att. providers found   Discharging Provider: Domingo Boswell MD  Primary Care Provider: Makayla Primary Doctor  [facility] Medicine Team: [facility] Domingo Boswell MD  Primary Care Team: [facility]    HPI:   Per  note    "68-year-old female with a history of GI bleed due to gastric AVMs and regi-en-y gastric bypass procedure (reportedly in the 1980s) admitted to [facility] on February 1 with episodes of hematemesis.  She was admitted and placed on Protonix infusion.  She received packed red blood cells.  While waiting for a bed in the emergency department she had a syncopal episode and was subsequently admitted to the ICU there.  She was seen by Gastroenterology and General Surgery. Endoscopy showed blood clot in the excluded stomach.  She had leukocytosis that was felt to be compensatory from her anemia/GI bleed. On repeat EGD February 2 she still had a large amount of clot in her stomach.  Clip was placed for marking/orienting. Initial hemoglobin was 8.0 on February 1. Hemoglobin was as high as 10.7 by the evening of February 1 but has subsequently trended down to 7.0 on February 3. White blood cells peaked at 34.79 on the evening of February 1 and subsequently trended down to 18.47 on February 3.  She is receiving additional packed red blood cells on February 3. She is currently in med-surg status.  No further hematemesis noted, and she is hemodynamically stable.  Referring team is requesting transfer to [facility] for higher level of care with the history of gastric bypass and uncertain bleeding etiology/status.  Case discussed with Gastroenterology, Advanced Endoscopy, and " "Interventional Radiology at [facility].  Will plan transfer to Hospital Medicine at [facility] in step-down status for further evaluation of likely gastric bleeding.  If she develops evidence of significant bleeding, she will need CTA with GI bleed protocol and potentially Interventional Radiology evaluation.  If she does not show evidence of active/significant GI bleeding, will need to determine if further endoscopic evaluation is indicated."    Patient with complaints of nausea prior to transfer from [facility] but no other complaints. Last episode of coffee ground emesis and melena  or BM on 2/1. GI and surgery consulted. CBC on admission 9.1.    * No surgery found *      Hospital Course:   GI and general surgery consulted. Hemoglobin stable during admission with no hematemesis, melena, ort hematochezia. No intervention by GI or general surgery given improved symptoms. Discharge with primary care and GI follow up.     Goals of Care Treatment Preferences:  Code Status: Full Code      Consults:   Consults (From admission, onward)          Status Ordering Provider     Inpatient consult to General Surgery  Once        Provider:  (Not yet assigned)    Completed MAURICE YOUNG     Inpatient consult to Gastroenterology  Once        Provider:  (Not yet assigned)    Completed MAURICE YOUNG.            No new Assessment & Plan notes have been filed under this hospital service since the last note was generated.  Service: Hospital Medicine    Final Active Diagnoses:    Diagnosis Date Noted POA    PRINCIPAL PROBLEM:  Gastrointestinal hemorrhage with melena [K92.1] 07/05/2019 Yes    Leukocytosis [D72.829] 02/02/2024 Yes    Acute blood loss anemia [D62] 07/05/2019 Yes    AVM (arteriovenous malformation) of stomach, acquired with hemorrhage [K31.811]  Yes      Problems Resolved During this Admission:       Discharged Condition: good    Disposition: Home or Self Care    Follow Up:    Patient Instructions:    "   Ambulatory referral/consult to Internal Medicine   Standing Status: Future   Referral Priority: Routine Referral Type: Consultation   Referral Reason: Specialty Services Required   Requested Specialty: Internal Medicine   Number of Visits Requested: 1     Ambulatory referral/consult to Gastroenterology   Standing Status: Future   Referral Priority: Routine Referral Type: Consultation   Referral Reason: Specialty Services Required   Requested Specialty: Gastroenterology   Number of Visits Requested: 1     Diet Adult Regular     Notify your health care provider if you experience any of the following:  increased confusion or weakness     Notify your health care provider if you experience any of the following:  persistent dizziness, light-headedness, or visual disturbances     Notify your health care provider if you experience any of the following:  worsening rash     Notify your health care provider if you experience any of the following:  severe persistent headache     Notify your health care provider if you experience any of the following:  difficulty breathing or increased cough     Notify your health care provider if you experience any of the following:  redness, tenderness, or signs of infection (pain, swelling, redness, odor or green/yellow discharge around incision site)     Notify your health care provider if you experience any of the following:  severe uncontrolled pain     Notify your health care provider if you experience any of the following:  persistent nausea and vomiting or diarrhea     Notify your health care provider if you experience any of the following:  temperature >100.4     Activity as tolerated       Significant Diagnostic Studies: Labs: All labs within the past 24 hours have been reviewed    Pending Diagnostic Studies:       None           Medications:  Reconciled Home Medications:      Medication List        CONTINUE taking these medications      acetaminophen 325 MG tablet  Commonly known  as: TYLENOL  Take 2 tablets (650 mg total) by mouth every 4 (four) hours as needed for Pain.     cyanocobalamin 1000 MCG tablet  Commonly known as: VITAMIN B-12  Take 1,000 mcg by mouth once daily.     ferrous sulfate 325 mg (65 mg iron) Tab tablet  Commonly known as: FEOSOL  Take 1 tablet (325 mg total) by mouth daily with breakfast.     loratadine 10 mg tablet  Commonly known as: CLARITIN  Take 1 tablet (10 mg total) by mouth once daily.     ondansetron 4 mg/2 mL Soln  Inject 4 mg into the vein every 6 (six) hours as needed (nausea).              Indwelling Lines/Drains at time of discharge:   Lines/Drains/Airways       None                   Time spent on the discharge of patient: 35 minutes         Domingo Boswell MD  Department of Hospital Medicine  [facility] ([place]

## 2024-02-07 ENCOUNTER — TELEPHONE (OUTPATIENT)
Dept: GASTROENTEROLOGY | Facility: CLINIC | Age: 69
End: 2024-02-07
Payer: MEDICARE

## 2024-02-07 NOTE — TELEPHONE ENCOUNTER
Spoke with pt; per pt she is uncertain the reason for someone scheduling her in [place] with a NP Akash when she prefers to return to Dr. Cardenas in [place] who is close to her home and [facility] where she was inpatient. Patient requests appt tomorrow 2/8/24 is cancelled with AL Bustos and she states she will pursue care with her providers AL Ramírez and Dr. Cardenas.

## 2024-03-06 ENCOUNTER — LAB VISIT (OUTPATIENT)
Dept: LAB | Facility: HOSPITAL | Age: 69
End: 2024-03-06
Attending: NURSE PRACTITIONER
Payer: MEDICARE

## 2024-03-06 ENCOUNTER — OFFICE VISIT (OUTPATIENT)
Dept: GASTROENTEROLOGY | Facility: CLINIC | Age: 69
End: 2024-03-06
Payer: MEDICARE

## 2024-03-06 VITALS — BODY MASS INDEX: 29.41 KG/M2 | WEIGHT: 159.81 LBS | HEIGHT: 62 IN

## 2024-03-06 DIAGNOSIS — Z09 HOSPITAL DISCHARGE FOLLOW-UP: Primary | ICD-10-CM

## 2024-03-06 DIAGNOSIS — E83.51 HYPOCALCEMIA: ICD-10-CM

## 2024-03-06 DIAGNOSIS — Z98.84 HISTORY OF GASTRIC BYPASS: ICD-10-CM

## 2024-03-06 DIAGNOSIS — K29.01 GASTROINTESTINAL HEMORRHAGE ASSOCIATED WITH ACUTE GASTRITIS: ICD-10-CM

## 2024-03-06 DIAGNOSIS — R63.4 WEIGHT LOSS: ICD-10-CM

## 2024-03-06 DIAGNOSIS — D50.0 IRON DEFICIENCY ANEMIA DUE TO CHRONIC BLOOD LOSS: ICD-10-CM

## 2024-03-06 LAB
ERYTHROCYTE [DISTWIDTH] IN BLOOD BY AUTOMATED COUNT: 14.6 % (ref 11.5–14.5)
FERRITIN SERPL-MCNC: 12 NG/ML (ref 20–300)
HCT VFR BLD AUTO: 31.2 % (ref 37–48.5)
HGB BLD-MCNC: 9.4 G/DL (ref 12–16)
IRON SERPL-MCNC: 18 UG/DL (ref 30–160)
MCH RBC QN AUTO: 26 PG (ref 27–31)
MCHC RBC AUTO-ENTMCNC: 30.1 G/DL (ref 32–36)
MCV RBC AUTO: 86 FL (ref 82–98)
PLATELET # BLD AUTO: 429 K/UL (ref 150–450)
PMV BLD AUTO: 10.8 FL (ref 9.2–12.9)
RBC # BLD AUTO: 3.62 M/UL (ref 4–5.4)
SATURATED IRON: 4 % (ref 20–50)
TOTAL IRON BINDING CAPACITY: 425 UG/DL (ref 250–450)
TRANSFERRIN SERPL-MCNC: 287 MG/DL (ref 200–375)
WBC # BLD AUTO: 6.64 K/UL (ref 3.9–12.7)

## 2024-03-06 PROCEDURE — 99214 OFFICE O/P EST MOD 30 MIN: CPT | Mod: S$GLB,,, | Performed by: NURSE PRACTITIONER

## 2024-03-06 PROCEDURE — 85027 COMPLETE CBC AUTOMATED: CPT | Performed by: NURSE PRACTITIONER

## 2024-03-06 PROCEDURE — 3008F BODY MASS INDEX DOCD: CPT | Mod: CPTII,S$GLB,, | Performed by: NURSE PRACTITIONER

## 2024-03-06 PROCEDURE — 99999 PR PBB SHADOW E&M-EST. PATIENT-LVL III: CPT | Mod: PBBFAC,,, | Performed by: NURSE PRACTITIONER

## 2024-03-06 PROCEDURE — 1160F RVW MEDS BY RX/DR IN RCRD: CPT | Mod: CPTII,S$GLB,, | Performed by: NURSE PRACTITIONER

## 2024-03-06 PROCEDURE — 1126F AMNT PAIN NOTED NONE PRSNT: CPT | Mod: CPTII,S$GLB,, | Performed by: NURSE PRACTITIONER

## 2024-03-06 PROCEDURE — 3044F HG A1C LEVEL LT 7.0%: CPT | Mod: CPTII,S$GLB,, | Performed by: NURSE PRACTITIONER

## 2024-03-06 PROCEDURE — 36415 COLL VENOUS BLD VENIPUNCTURE: CPT | Mod: PO | Performed by: NURSE PRACTITIONER

## 2024-03-06 PROCEDURE — 1159F MED LIST DOCD IN RCRD: CPT | Mod: CPTII,S$GLB,, | Performed by: NURSE PRACTITIONER

## 2024-03-06 PROCEDURE — 82728 ASSAY OF FERRITIN: CPT | Performed by: NURSE PRACTITIONER

## 2024-03-06 PROCEDURE — 1111F DSCHRG MED/CURRENT MED MERGE: CPT | Mod: CPTII,S$GLB,, | Performed by: NURSE PRACTITIONER

## 2024-03-06 PROCEDURE — 83540 ASSAY OF IRON: CPT | Performed by: NURSE PRACTITIONER

## 2024-03-06 PROCEDURE — 3288F FALL RISK ASSESSMENT DOCD: CPT | Mod: CPTII,S$GLB,, | Performed by: NURSE PRACTITIONER

## 2024-03-06 PROCEDURE — 1101F PT FALLS ASSESS-DOCD LE1/YR: CPT | Mod: CPTII,S$GLB,, | Performed by: NURSE PRACTITIONER

## 2024-03-06 RX ORDER — PANTOPRAZOLE SODIUM 40 MG/1
40 TABLET, DELAYED RELEASE ORAL
Qty: 30 TABLET | Refills: 1 | Status: SHIPPED | OUTPATIENT
Start: 2024-03-06 | End: 2024-04-03

## 2024-03-06 NOTE — PROGRESS NOTES
"Subjective:       Patient ID: Balwinder Rodriguez is a 68 y.o. female Body mass index is 29.23 kg/m².    Chief Complaint: Hospital Follow Up and GI Bleeding    This patient is established with Dr. Cardenas & myself.    Patient reports she had stopped taking iron supplement due to dark stools with use and she was not able to tell if she is bleeding.    PRIOR HISTORY: Patient is here to establish care with Dr. Cardenas. Patient reports history of multiple gastric AVMs throughout the years that have required numerous EGDs and hospitalization. Patient reports she can sense when an episode is about to occur/is occurring with symptoms of fatigue & dark stools. Patient reports the multiple hospitalizations has become quite expensive and reports she wants to establish care with a GI doctor who she can call when she notices GI symptoms so she can have blood work done and scheduled outpatient endoscopies to try an avoid hospitalizations.    Reviewed hospital discharge summary: "Admission Date: 2/4/2024  Hospital Length of Stay: 1 days  Discharge Date and Time: 2/5/2024  5:10 PM  Attending Physician: Makayla att. providers found   Discharging Provider: Domingo Boswell MD  Primary Care Provider: Makayla Primary Doctor  [facility] Medicine Team: [facility] MED  Domingo Boswell MD  Primary Care Team: [facility] MED   HPI:   Per  note  "68-year-old female with a history of GI bleed due to gastric AVMs and regi-en-y gastric bypass procedure (reportedly in the 1980s) admitted to [facility] on February 1 with episodes of hematemesis.  She was admitted and placed on Protonix infusion.  She received packed red blood cells.  While waiting for a bed in the emergency department she had a syncopal episode and was subsequently admitted to the ICU there.  She was seen by Gastroenterology and General Surgery. Endoscopy showed blood clot in the excluded stomach.  She had leukocytosis that was felt to be compensatory from her anemia/GI " "bleed. On repeat EGD February 2 she still had a large amount of clot in her stomach.  Clip was placed for marking/orienting. Initial hemoglobin was 8.0 on February 1. Hemoglobin was as high as 10.7 by the evening of February 1 but has subsequently trended down to 7.0 on February 3. White blood cells peaked at 34.79 on the evening of February 1 and subsequently trended down to 18.47 on February 3.  She is receiving additional packed red blood cells on February 3. She is currently in med-surg status.  No further hematemesis noted, and she is hemodynamically stable.  Referring team is requesting transfer to [facility] for higher level of care with the history of gastric bypass and uncertain bleeding etiology/status.  Case discussed with Gastroenterology, Advanced Endoscopy, and Interventional Radiology at [facility].  Will plan transfer to Hospital Medicine at [facility] in step-down status for further evaluation of likely gastric bleeding.  If she develops evidence of significant bleeding, she will need CTA with GI bleed protocol and potentially Interventional Radiology evaluation.  If she does not show evidence of active/significant GI bleeding, will need to determine if further endoscopic evaluation is indicated."  Patient with complaints of nausea prior to transfer from OSH but no other complaints. Last episode of coffee ground emesis and melena  or BM on 2/1. GI and surgery consulted. CBC on admission 9.1.  * No surgery found *    Hospital Course:   GI and general surgery consulted. Hemoglobin stable during admission with no hematemesis, melena, ort hematochezia. No intervention by GI or general surgery given improved symptoms. Discharge with primary care and GI follow up."    GI Problem  The primary symptoms include weight loss (relates to being hospitalized; reports her appetite is back to normal now). Primary symptoms do not include fever, fatigue, abdominal pain, nausea, vomiting, diarrhea, " "melena, hematemesis, jaundice, hematochezia or dysuria. The problem has been resolved (patient reports feeling great since hospital discharge).   The illness does not include chills, anorexia, dysphagia, odynophagia or constipation. Significant associated medical issues include GERD (occasional heartburn; mylanta PRN), PUD and gastric bypass. Associated medical issues do not include inflammatory bowel disease.     Review of Systems   Constitutional:  Positive for weight loss (relates to being hospitalized; reports her appetite is back to normal now). Negative for appetite change, chills, fatigue and fever.   HENT:  Negative for sore throat and trouble swallowing.    Respiratory:  Negative for cough, choking and shortness of breath.    Cardiovascular:  Negative for chest pain.   Gastrointestinal:  Negative for abdominal pain, anal bleeding, anorexia, blood in stool, constipation, diarrhea, dysphagia, hematemesis, hematochezia, jaundice, melena, nausea, rectal pain and vomiting.   Genitourinary:  Negative for difficulty urinating, dysuria and flank pain.   Neurological:  Negative for weakness.       No LMP recorded. Patient is postmenopausal.  Past Medical History:   Diagnosis Date    AVM (arteriovenous malformation) of stomach, acquired with hemorrhage 1993    Last time cauterized 2012; Blood tranfusion 7/6/19    Encounter for blood transfusion     multiple     Past Surgical History:   Procedure Laterality Date    COLONOSCOPY  ~2015    Dr. Jacobsen    COLONOSCOPY  04/07/2009    Dr. Jacobsen, sent for scanning: melena throughout colon, prominent hemorrhoids, tortous colon, otherwise unremarkable findings    COLONOSCOPY N/A 8/9/2019    Procedure: COLONOSCOPY;  Surgeon: Dudley Cardenas Jr., MD;  Location: [facility];  Service: Endoscopy;  Laterality: N/A;    ENTEROSCOPIC PUSH PROCEDURE  04/08/2009    Dr. Jacobsen, sent for scanning: distal esophageal erythema, gastric remnant appeared to be unremarkable, "the limbs of the " "Basilio-en-Y appeared to be unremarkable as well as the conduit from the stomach to the Basilio-en-Y"    ENTEROSCOPIC PUSH PROCEDURE  12/05/2009    Dr. Jacobsen, sent for scanning: blood seen, bleeder not found, suspect AVM, a couple of erosions in stomach    ENTEROSCOPIC PUSH PROCEDURE  12/06/2009    Dr. Jacobsen, sent for scanning: distal esophageal erythema, s/p gastric bypass, large arteriovenous malformation noted in the proximal jenunum successfully ablated, site tattooed    ESOPHAGOGASTRODUODENOSCOPY N/A 7/6/2019    Procedure: EGD (ESOPHAGOGASTRODUODENOSCOPY);  Surgeon: Virgilio Ochoa Jr., MD;  Location: [facility] ENDO;  Service: Endoscopy;  Laterality: N/A;    ESOPHAGOGASTRODUODENOSCOPY N/A 2/1/2024    Procedure: ESOPHAGOGASTRODUODENOSCOPY (EGD);  Surgeon: Dudley Cardenas Jr., MD;  Location: [facility] ENDO;  Service: Endoscopy;  Laterality: N/A;    ESOPHAGOGASTRODUODENOSCOPY N/A 2/2/2024    Procedure: ESOPHAGOGASTRODUODENOSCOPY (EGD);  Surgeon: Dudley Cardenas Jr., MD;  Location: [facility] ENDO;  Service: Endoscopy;  Laterality: N/A;    gastric stapling      UPPER GASTROINTESTINAL ENDOSCOPY  07/06/2019    Dr. Ochoa, in notes: "S/P Basilio - N - Y gastric bypass" & "No source GIB seen"    UPPER GASTROINTESTINAL ENDOSCOPY  07/22/2010    EGD with push enteroscopy; Dr. Jacobsen, sent for scanning (hard to read handwriting)    UPPER GASTROINTESTINAL ENDOSCOPY  04/05/2009    Dr. Delacruz, sent for scanning: non-bleeding anastomotic ulcer, post gastric stapling    UPPER GASTROINTESTINAL ENDOSCOPY  12/04/2009    Dr. Jacobsen, sent for scanning, EGD with push enteroscopy: distal esophageal erythema and severe gastroparesis, blood seen in duodenum and proximal jejenum    video capsule endoscopy       Family History   Problem Relation Age of Onset    Colon cancer Neg Hx     Crohn's disease Neg Hx     Ulcerative colitis Neg Hx     Celiac disease Neg Hx     Esophageal cancer Neg Hx     Stomach cancer Neg Hx      Wt Readings from Last 10 Encounters: "   03/06/24 72.5 kg (159 lb 13.3 oz)   02/03/24 79.7 kg (175 lb 11.3 oz)   12/10/19 86.2 kg (190 lb)   08/08/19 86.2 kg (190 lb)   07/31/19 85.8 kg (189 lb 2.5 oz)   07/05/19 83 kg (182 lb 14.4 oz)     Lab Results   Component Value Date    WBC 10.53 02/05/2024    HGB 8.8 (L) 02/05/2024    HCT 26.9 (L) 02/05/2024    MCV 88 02/05/2024     02/05/2024     Lab Results   Component Value Date    IRON 79 02/01/2024    TIBC 315 02/01/2024    FERRITIN 5 (L) 02/01/2024     CMP  Sodium   Date Value Ref Range Status   02/05/2024 139 136 - 145 mmol/L Final     Potassium   Date Value Ref Range Status   02/05/2024 3.2 (L) 3.5 - 5.1 mmol/L Final     Chloride   Date Value Ref Range Status   02/05/2024 107 95 - 110 mmol/L Final     CO2   Date Value Ref Range Status   02/05/2024 20 (L) 23 - 29 mmol/L Final     Glucose   Date Value Ref Range Status   02/05/2024 74 70 - 110 mg/dL Final     BUN   Date Value Ref Range Status   02/05/2024 20 8 - 23 mg/dL Final     Creatinine   Date Value Ref Range Status   02/05/2024 0.8 0.5 - 1.4 mg/dL Final     Calcium   Date Value Ref Range Status   02/05/2024 8.1 (L) 8.7 - 10.5 mg/dL Final     Total Protein   Date Value Ref Range Status   02/05/2024 5.3 (L) 6.0 - 8.4 g/dL Final     Albumin   Date Value Ref Range Status   02/05/2024 2.6 (L) 3.5 - 5.2 g/dL Final     Total Bilirubin   Date Value Ref Range Status   02/05/2024 0.3 0.1 - 1.0 mg/dL Final     Comment:     For infants and newborns, interpretation of results should be based  on gestational age, weight and in agreement with clinical  observations.    Premature Infant recommended reference ranges:  Up to 24 hours.............<8.0 mg/dL  Up to 48 hours............<12.0 mg/dL  3-5 days..................<15.0 mg/dL  6-29 days.................<15.0 mg/dL       Alkaline Phosphatase   Date Value Ref Range Status   02/05/2024 59 55 - 135 U/L Final     AST   Date Value Ref Range Status   02/05/2024 15 10 - 40 U/L Final     ALT   Date Value Ref Range  "Status   02/05/2024 12 10 - 44 U/L Final     Anion Gap   Date Value Ref Range Status   02/05/2024 12 8 - 16 mmol/L Final     eGFR if    Date Value Ref Range Status   07/07/2019 >60 >60 mL/min/1.73 m^2 Final     eGFR if non    Date Value Ref Range Status   07/07/2019 >60 >60 mL/min/1.73 m^2 Final     Comment:     Calculation used to obtain the estimated glomerular filtration  rate (eGFR) is the CKD-EPI equation.        Lab Results   Component Value Date    TSH 3.900 07/05/2019     Lab Results   Component Value Date    OCCULTBLOOD Positive (A) 07/05/2019     Reviewed prior medical records including radiology report of 2/3/2024 abdominal x-ray; & endoscopy history (see surgical history).    Previously reviewed medical records received from patient (from [facility] & Dr. Jacobsen), summarized below and in medical & surgical history (endoscopies, etc), & given to nurse to be scanned into system:   See records for lab results.  7/22/10-7/23/10 discharge summary reviewed: "patient was admitted after the push enteroscopy because we found significant bleeding and anemia." "She has a known arteriovenous malformation right near the anastomosis distally in the jejenum."  4/4/09-4/9/09 discharge summary reviewed: "the patient apparently has an ulcer at the anastomosis on upper gastrointestinal endoscopy. This was a very small ulcer." "on April 7, 2009 she underwent colonoscopy. The colonoscopy showed black stool throughout, but no etiology of her bleeding." "She underwent a push enteroscopy on April 8, 2009, but again that did not show any etiology. The previously seen small ulcer had completely healed by the time the push enteroscopy was done."  Objective:      Physical Exam  Vitals and nursing note reviewed.   Constitutional:       General: She is not in acute distress.     Appearance: Normal appearance. She is well-developed. She is not diaphoretic.   HENT:      Mouth/Throat:      " Lips: Pink. No lesions.      Mouth: Mucous membranes are moist. No oral lesions.      Tongue: No lesions.      Pharynx: Oropharynx is clear. No pharyngeal swelling or posterior oropharyngeal erythema.   Eyes:      General: No scleral icterus.     Conjunctiva/sclera: Conjunctivae normal.   Pulmonary:      Effort: Pulmonary effort is normal. No respiratory distress.      Breath sounds: Normal breath sounds. No wheezing.   Abdominal:      General: Bowel sounds are normal. There is no distension or abdominal bruit.      Palpations: Abdomen is soft. Abdomen is not rigid. There is no mass.      Tenderness: There is no abdominal tenderness. There is no guarding or rebound. Negative signs include Lerma's sign and McBurney's sign.   Skin:     General: Skin is warm and dry.      Coloration: Skin is not jaundiced or pale.      Findings: No erythema or rash.   Neurological:      Mental Status: She is alert and oriented to person, place, and time.   Psychiatric:         Behavior: Behavior normal.         Thought Content: Thought content normal.         Judgment: Judgment normal.         Assessment:       1. Hospital discharge follow-up    2. Gastrointestinal hemorrhage associated with acute gastritis    3. Iron deficiency anemia due to chronic blood loss    4. Weight loss    5. Hypocalcemia    6. History of gastric bypass        Plan:       Hospital discharge follow-up, Gastrointestinal hemorrhage associated with acute gastritis, & Iron deficiency anemia due to chronic blood loss  -  START   pantoprazole (PROTONIX) 40 MG tablet; Take 1 tablet (40 mg total) by mouth before breakfast.  Dispense: 30 tablet; Refill: 1  -     CBC Without Differential; Future; Expected date: 03/06/2024  -     Ferritin; Future; Expected date: 03/06/2024  -     Iron and TIBC; Future; Expected date: 03/06/2024  - patient reports she will restart iron supplement since she is feeling better and no longer having signs of GI bleeding  - will discuss with  Dr. Cardenas to see about possible referral to iron infusions; note routed to Dr. Cardenas.  - Recommend follow-up with Primary Care Provider/hematology for continued evaluation and management.    Weight loss  - encouraged PO intake and daily calorie counts to ensure adequate nutrition is taken in, recommend at least 2,000 calories a day  - recommend nutritional drinks, such as Boost, Ensure or Glucerna, to supplement nutrition needs  - Possible CT scan/colonoscopy pending results of testing and if symptoms persist    History of gastric bypass  - Recommend follow-up with bariatric surgery for continued evaluation and management.    Hypocalcemia  Recommend follow-up with Primary Care Provider for continued evaluation and management.    Follow up in about 1 month (around 4/6/2024), or if symptoms worsen or fail to improve.      If no improvement in symptoms or symptoms worsen, call/follow-up at clinic or go to ER.        43 minutes of total time spent on the encounter, which includes face to face time and non-face to face time preparing to see the patient (e.g., review of tests), Obtaining and/or reviewing separately obtained history, Documenting clinical information in the electronic or other health record, Independently interpreting results (not separately reported) and communicating results to the patient/family/caregiver, or Care coordination (not separately reported).

## 2024-03-06 NOTE — Clinical Note
Just wanted to update you on patient's status and see what your recommendations are for iron supplementation. Patient reports she had stopped PO iron use due to dark stools with use and she was not able to tell if it was from the iron or if she has bleeding again. Should we consider referral hematology to consult for IV iron infusion? Thanks CHELA

## 2024-03-06 NOTE — PATIENT INSTRUCTIONS
"Gastritis   The Basics   Written by the doctors and editors at [facility]   What is gastritis? -- "Gastritis" means inflammation of the stomach lining (figure 1).  Some people have gastritis that comes on suddenly and lasts only for a short time. Doctors call this "acute" gastritis. Other people have gastritis that lasts for months or years. Doctors call this "chronic" gastritis.  What causes gastritis? -- Different things can cause gastritis, including:  An infection in the stomach from bacteria called "H. pylori"  Medicines called "nonsteroidal antiinflammatory drugs" (NSAIDs) - These include aspirin, ibuprofen (brand names: Advil, Motrin), and naproxen (brand names: Aleve, Naprosyn).  Drinking alcohol  Conditions in which the body's infection-fighting system attacks the stomach lining  Having a serious or life-threatening illness  What are the symptoms of gastritis? -- People with gastritis have no symptoms. When people do have symptoms, they are due to other conditions that can happen with gastritis, like ulcers. Symptoms from ulcers include:  Pain in the upper belly  Feeling bloated, or feeling full after eating a small amount of food  Decreased appetite  Nausea or vomiting  Vomiting blood, or having black-colored bowel movements  Feeling more tired than usual - This happens if people with gastritis get a condition called "anemia."  Should I call my doctor or nurse? -- Call your doctor or nurse if:  You have belly pain that gets worse or doesn't go away  You vomit blood or have black bowel movements  You are losing weight (without trying to)  Will I need tests? -- Probably. Your doctor or nurse will ask about your symptoms and do an exam. They might also do:  An upper endoscopy - During this procedure, the doctor puts a thin tube with a camera on the end into your mouth and down into your stomach (figure 2). they will look at the inside of your stomach. During the procedure, they might also do a test called a " biopsy. For a biopsy, the doctor takes a small sample of the stomach lining. Then another doctor looks at the sample under a microscope.  Tests to check for H. pylori infection. These can include:  Blood tests  Breath tests - These tests measure substances in your breath after you drink a special liquid.  Tests on a small sample of your bowel movement  Blood tests to check for anemia  How is gastritis treated? -- Treatment depends on what's causing your gastritis.  For example, if NSAIDs are causing your gastritis, your doctor will recommend that you not take those medicines. If alcohol is causing your gastritis, they will recommend that you stop drinking alcohol.  Doctors can use medicines to treat gastritis caused by an H. pylori infection. Most people take 3 or more medicines for 2 weeks. The treatment includes antibiotics plus medicine that helps the stomach make less acid.  Doctors can use medicines that reduce or block stomach acid to treat other causes of gastritis (table 1). The main types of medicines that reduce or block stomach acid are:  Antacids  Surface agents  Histamine blockers  Proton pump inhibitors  If your doctor recommends acid-reducing treatment, they will tell you which medicine to use.  What happens after treatment? -- Sometimes, people who are treated for an H. pylori infection need follow-up tests to make sure the infection is gone. Follow-up tests include breath tests, lab tests on a sample of bowel movement, or endoscopy.  All topics are updated as new evidence becomes available and our peer review process is complete.  This topic retrieved from [employer] on: Sep 21, 2021.  Topic 90718 Version 8.0  Release: 29.4.2 - C29.263  © 2021 UpToDate, Inc. and/or its affiliates. All rights reserved.  figure 1: Upper digestive tract     The upper digestive tract includes the esophagus (the tube that connects the mouth to the stomach), the stomach, and the duodenum (the first part of the small  intestine).  Graphic 51001 Version 6.0    figure 2: Upper endoscopy     During an upper endoscopy, you lie down and the doctor puts a thin tube with a camera and light on the end (called an endoscope) into your mouth and down into your esophagus, stomach, and duodenum (the first part of your small intestine). The camera sends pictures from inside your body to a television screen. That way, your doctor can see the inside of your esophagus, stomach, and duodenum.  Graphic 46171 Version 4.0    table 1: Medicines used to reduce stomach acid  Medicine type  Medicine name examples    Antacids* Calcium carbonate (sample brand names: Maalox, Tums)    Aluminum hydroxide, magnesium hydroxide, and simethicone (sample brand name: Mylanta)   Surface agents Sucralfate (brand name: Carafate)   Histamine blockers¶  Famotidine (brand name: Pepcid)    Cimetidine (brand name: Tagamet)   Proton pump inhibitors Omeprazole (brand name: Prilosec)    Esomeprazole (brand name: Nexium)    Pantoprazole (brand name: Protonix)    Lansoprazole (brand name: Prevacid)    Dexlansoprazole (brand name: Dexilant)    Rabeprazole (brand name: AcipHex)   Graphic 28181 Version 14.0  Consumer Information Use and Disclaimer   This information is not specific medical advice and does not replace information you receive from your health care provider. This is only a brief summary of general information. It does NOT include all information about conditions, illnesses, injuries, tests, procedures, treatments, therapies, discharge instructions or life-style choices that may apply to you. You must talk with your health care provider for complete information about your health and treatment options. This information should not be used to decide whether or not to accept your health care provider's advice, instructions or recommendations. Only your health care provider has the knowledge and training to provide advice that is right for you. The use of this information  is governed by the [employer] End User License Agreement, available at https://www.[employer]/en/solutions/[employer]/about/shanel.The use of UpToDate content is governed by the [employer] Terms of Use. ©2021 UpToDate, Inc. All rights reserved.  Copyright   © 2021 UpToDate, Inc. and/or its affiliates. All rights reserved.      Gastritis Discharge Instructions   About this topic   Gastritis is inflammation of the lining of the stomach. Sometimes gastritis is caused by bacteria. Other times, it can be caused by drugs. Some types of drugs can cause gastritis. The most common are nonsteroidal anti-inflammatory drugs (NSAIDs) like ibuprofen or naproxen. Gastritis can also be caused by other things like drinking alcohol or having a serious illness. It can also happen if you have a health problem in which the bodys own infection fighting system attacks the stomach lining. Based on the cause, you may need to take an antibiotic or other medicine to treat your gastritis. If so, be sure you finish all of the medicine that is ordered.     What care is needed at home?   Ask your doctor what you need to do when you go home. Make sure you ask questions if you do not understand what the doctor says.  Eat small meals more often to help with belly pain.  Keep a diary about your pain and the foods you eat. Then you can avoid those that bother your stomach.  Avoid or limit spicy foods.  Avoid or limit beer, wine, and mixed drinks.  If you smoke, try to quit. Your doctor or nurse can help.  Try to learn ways to manage stress. Stress may cause the acid levels in your stomach to rise.  If possible, avoid long-term use of aspirin and other anti-inflammatory drugs.  What follow-up care is needed?   Your doctor may ask you to make visits to the office to check on your progress. Be sure to keep these visits.  What drugs may be needed?   The doctor may order drugs to:  Fight an infection  Control how much acid your stomach makes  Help  healing  Will physical activity be limited?   You may want to limit your activity if you have belly pain. Having an upset stomach or throwing up may also limit what you do. You may need more rest if you feel weak or tired.  What problems could happen?   Stomach ulcer or bleeding  Stomach cancer  When do I need to call the doctor?   You start throwing up blood or pass a lot of blood in your stool.  Your belly pain becomes much worse all of a sudden or over a few hours.  Your belly becomes hard or tender.  You have chest pain or trouble breathing  Your stools are bright red, black, or tar-colored.  You are throwing up often.  Your belly pain does not get better even after taking medicine, changing your diet, and following treatment instructions.  You lose a lot of weight without trying.  Teach Back: Helping You Understand   The Teach Back Method helps you understand the information we are giving you. After you talk with the staff, tell them in your own words what you learned. This helps to make sure the staff has described each thing clearly. It also helps to explain things that may have been confusing. Before going home, make sure you can do these:  I can tell you about my condition.  I can tell you if I need to make changes with my diet or drugs.  I can tell you what I will do if I throw up blood or have bloody or black tarry stools.  Where can I learn more?   [facility] [place]  https://www.nhs.[place]/conditions/gastritis/   Last Reviewed Date   2021-06-08  Consumer Information Use and Disclaimer   This information is not specific medical advice and does not replace information you receive from your health care provider. This is only a brief summary of general information. It does NOT include all information about conditions, illnesses, injuries, tests, procedures, treatments, therapies, discharge instructions or life-style choices that may apply to you. You must talk with your health care provider for  complete information about your health and treatment options. This information should not be used to decide whether or not to accept your health care providers advice, instructions or recommendations. Only your health care provider has the knowledge and training to provide advice that is right for you.  Copyright   Copyright © 2021 UpToDate, Inc. and its affiliates and/or licensors. All rights reserved.   Anemia  Anemia is a condition that occurs when your body does not have enough healthy red blood cells (RBCs). RBCs are the parts of your blood that carry oxygen throughout your body. A protein called hemoglobin allows your RBCs to absorb and release oxygen. Without enough RBCs or hemoglobin, your body doesn't get enough oxygen. Symptoms of anemia may then occur.    What are the symptoms of anemia?  Some people with anemia have no symptoms. But most people have symptoms that range from mild to severe. These can include:  Tiredness (fatigue)  Weakness  Pale skin  Shortness of breath  Dizziness or fainting  Rapid heartbeat  Trouble doing normal amounts of activity  Jaundice (yellowing of your eyes, skin, or mouth; dark urine)  What causes anemia?  Anemia can occur when your body:  Loses too much blood  Does not make enough RBCs  Destroys your RBCs at a faster rate than it can replace them  Does not make a normal amount of hemoglobin in your RBCs  These problems can occur for many reasons, including:  A condition that you are born with (congenital or inherited), such as sickle cell disease or thalassemia  Heavy bleeding for any reason, including injury, surgery, childbirth, or even heavy menstrual periods  Being low in certain nutrients, such as iron, folate, or vitamin B12, possibly from a poor diet or a condition like celiac disease or Crohn's disease  Certain chronic conditions like diabetes, arthritis, or kidney disease  Certain chronic infections like tuberculosis or HIV  Exposure to certain medicines, such as  those used for chemotherapy  There are different types of anemia. Your healthcare provider can tell you more about the type of anemia you have and what may have caused it.  How is anemia diagnosed?  To diagnose anemia, your healthcare provider orders blood tests. These can include:  Complete blood cell count (CBC). This test measures the amounts of the different types of blood cells.  Blood smear. This test checks the size and shape of your blood cells. To do the test, a drop of your blood is viewed under a microscope. A stain is used to make the blood cells easier to see.  Iron studies. These tests measure the amount of iron in your blood. Your body needs iron to make hemoglobin in your RBCs.  Vitamin B12 and folate studies. These tests check for some of the components that help give RBCs a normal size and shape.  Reticulocyte count. This test measures the amount of new RBCs that your bone marrow makes.  Hemoglobin electrophoresis. This test checks for problems with your hemoglobin in RBCs.  How is anemia treated?  Treatment for anemia is based on the type of anemia, its cause, and the severity of your symptoms. Treatments may include:  Diet changes. This involves increasing the amount of certain nutrients in your diet, such as iron, vitamin B12, or folate. Your healthcare provider may also prescribe nutrient supplements.  Medicines. Certain medicines treat the cause of your anemia. Others help build new RBCs or relieve symptoms. If a medicine is the cause of your anemia, you may need to stop or change it.  Blood transfusions. Replacing some of your blood can increase the number of healthy RBCs in your body.  Surgery. In some cases, your doctor may do surgery to treat the underlying cause of anemia. If you need surgery, your healthcare provider will explain the procedure and outline the risks and benefits for you.  What are the long-term concerns?  If you have a certain type of anemia, you can expect a full  recovery after treatment. If you have other types of anemia (especially a type you're born with), you will need to manage it for life. Your doctor can tell you more.  Date Last Reviewed: 12/1/2016  © 8463-0399 [employer]. [address] [place] [address], [place] [address]. All rights reserved. This information is not intended as a substitute for professional medical care. Always follow your healthcare professional's instructions.

## 2024-03-08 DIAGNOSIS — D50.0 IRON DEFICIENCY ANEMIA DUE TO CHRONIC BLOOD LOSS: Primary | ICD-10-CM

## 2024-03-31 ENCOUNTER — PATIENT MESSAGE (OUTPATIENT)
Dept: GASTROENTEROLOGY | Facility: CLINIC | Age: 69
End: 2024-03-31
Payer: MEDICARE

## 2024-04-01 DIAGNOSIS — K29.01 GASTROINTESTINAL HEMORRHAGE ASSOCIATED WITH ACUTE GASTRITIS: ICD-10-CM

## 2024-04-02 ENCOUNTER — LAB VISIT (OUTPATIENT)
Dept: LAB | Facility: HOSPITAL | Age: 69
End: 2024-04-02
Attending: NURSE PRACTITIONER
Payer: MEDICARE

## 2024-04-02 DIAGNOSIS — D50.0 IRON DEFICIENCY ANEMIA DUE TO CHRONIC BLOOD LOSS: ICD-10-CM

## 2024-04-02 LAB
ERYTHROCYTE [DISTWIDTH] IN BLOOD BY AUTOMATED COUNT: 16.5 % (ref 11.5–14.5)
FERRITIN SERPL-MCNC: 18 NG/ML (ref 20–300)
HCT VFR BLD AUTO: 33.5 % (ref 37–48.5)
HGB BLD-MCNC: 10.3 G/DL (ref 12–16)
IRON SERPL-MCNC: 288 UG/DL (ref 30–160)
MCH RBC QN AUTO: 26 PG (ref 27–31)
MCHC RBC AUTO-ENTMCNC: 30.7 G/DL (ref 32–36)
MCV RBC AUTO: 85 FL (ref 82–98)
PLATELET # BLD AUTO: 411 K/UL (ref 150–450)
PMV BLD AUTO: 10.4 FL (ref 9.2–12.9)
RBC # BLD AUTO: 3.96 M/UL (ref 4–5.4)
SATURATED IRON: 66 % (ref 20–50)
TOTAL IRON BINDING CAPACITY: 435 UG/DL (ref 250–450)
TRANSFERRIN SERPL-MCNC: 294 MG/DL (ref 200–375)
WBC # BLD AUTO: 6.13 K/UL (ref 3.9–12.7)

## 2024-04-02 PROCEDURE — 85027 COMPLETE CBC AUTOMATED: CPT | Performed by: NURSE PRACTITIONER

## 2024-04-02 PROCEDURE — 82728 ASSAY OF FERRITIN: CPT | Performed by: NURSE PRACTITIONER

## 2024-04-02 PROCEDURE — 36415 COLL VENOUS BLD VENIPUNCTURE: CPT | Mod: PO | Performed by: NURSE PRACTITIONER

## 2024-04-02 PROCEDURE — 83540 ASSAY OF IRON: CPT | Performed by: NURSE PRACTITIONER

## 2024-04-03 DIAGNOSIS — D50.0 IRON DEFICIENCY ANEMIA DUE TO CHRONIC BLOOD LOSS: Primary | ICD-10-CM

## 2024-04-03 RX ORDER — PANTOPRAZOLE SODIUM 40 MG/1
40 TABLET, DELAYED RELEASE ORAL
Qty: 90 TABLET | Refills: 1 | Status: SHIPPED | OUTPATIENT
Start: 2024-04-03 | End: 2024-04-27 | Stop reason: SDUPTHER

## 2024-04-04 ENCOUNTER — TELEPHONE (OUTPATIENT)
Dept: HEMATOLOGY/ONCOLOGY | Facility: CLINIC | Age: 69
End: 2024-04-04
Payer: MEDICARE

## 2024-04-05 ENCOUNTER — TELEPHONE (OUTPATIENT)
Dept: HEMATOLOGY/ONCOLOGY | Facility: CLINIC | Age: 69
End: 2024-04-05
Payer: MEDICARE

## 2024-04-05 NOTE — TELEPHONE ENCOUNTER
Returned call to pt and scheduled from Heme referral.  Sched pt for May 2nd, confirmed the appt date time and location.  Pt said she is retired and can come in any time, if an earlier appt came up.

## 2024-04-10 ENCOUNTER — TELEPHONE (OUTPATIENT)
Dept: HEMATOLOGY/ONCOLOGY | Facility: CLINIC | Age: 69
End: 2024-04-10
Payer: MEDICARE

## 2024-04-10 NOTE — TELEPHONE ENCOUNTER
Received msg that Dr Herron will not be here at [facility] on May 2nd for appts.   Attempt to contact the pt to reschedule and lvm.

## 2024-04-11 ENCOUNTER — TELEPHONE (OUTPATIENT)
Dept: HEMATOLOGY/ONCOLOGY | Facility: CLINIC | Age: 69
End: 2024-04-11
Payer: MEDICARE

## 2024-04-11 NOTE — NURSING
Returned patients phone call rescheduled appointment with Dr. Herron. Pt verbalized agreement to time/date/location.

## 2024-04-11 NOTE — TELEPHONE ENCOUNTER
----- Message from Cynthia Page MA sent at 4/11/2024  9:56 AM CDT -----  Delicia Lopez, Patient Care Assistant  P [facility]  Pool  Caller: Unspecified (Today,  9:18 AM)  Type: Needs Medical Advice  Who Called:  luis  Lakhwinder Call Back Number: [phone]    Additional Information: luis states she has to reschedule her 5/2 per provider , please call to further discuss thank you .     Pt need to be rescheduled new pt appt

## 2024-04-27 DIAGNOSIS — K29.01 GASTROINTESTINAL HEMORRHAGE ASSOCIATED WITH ACUTE GASTRITIS: ICD-10-CM

## 2024-04-30 RX ORDER — PANTOPRAZOLE SODIUM 40 MG/1
40 TABLET, DELAYED RELEASE ORAL
Qty: 90 TABLET | Refills: 1 | Status: SHIPPED | OUTPATIENT
Start: 2024-04-30

## 2024-05-06 PROBLEM — K92.2 GI BLEEDING: Status: RESOLVED | Noted: 2024-02-02 | Resolved: 2024-05-06

## 2024-06-19 ENCOUNTER — TELEPHONE (OUTPATIENT)
Dept: HEMATOLOGY/ONCOLOGY | Facility: CLINIC | Age: 69
End: 2024-06-19
Payer: MEDICARE

## 2024-06-20 ENCOUNTER — OFFICE VISIT (OUTPATIENT)
Dept: HEMATOLOGY/ONCOLOGY | Facility: CLINIC | Age: 69
End: 2024-06-20
Payer: MEDICARE

## 2024-06-20 ENCOUNTER — LAB VISIT (OUTPATIENT)
Dept: LAB | Facility: HOSPITAL | Age: 69
End: 2024-06-20
Attending: INTERNAL MEDICINE
Payer: MEDICARE

## 2024-06-20 VITALS
TEMPERATURE: 97 F | WEIGHT: 166 LBS | HEART RATE: 73 BPM | RESPIRATION RATE: 18 BRPM | SYSTOLIC BLOOD PRESSURE: 146 MMHG | DIASTOLIC BLOOD PRESSURE: 88 MMHG | HEIGHT: 62 IN | OXYGEN SATURATION: 95 % | BODY MASS INDEX: 30.55 KG/M2

## 2024-06-20 DIAGNOSIS — D50.0 IRON DEFICIENCY ANEMIA DUE TO CHRONIC BLOOD LOSS: ICD-10-CM

## 2024-06-20 DIAGNOSIS — E53.8 B12 DEFICIENCY: Primary | ICD-10-CM

## 2024-06-20 LAB
ALBUMIN SERPL BCP-MCNC: 3.9 G/DL (ref 3.5–5.2)
ALP SERPL-CCNC: 65 U/L (ref 55–135)
ALT SERPL W/O P-5'-P-CCNC: 11 U/L (ref 10–44)
ANION GAP SERPL CALC-SCNC: 12 MMOL/L (ref 8–16)
AST SERPL-CCNC: 16 U/L (ref 10–40)
BASOPHILS # BLD AUTO: 0.04 K/UL (ref 0–0.2)
BASOPHILS NFR BLD: 0.5 % (ref 0–1.9)
BILIRUB SERPL-MCNC: 0.2 MG/DL (ref 0.1–1)
BILIRUB UR QL STRIP: NEGATIVE
BUN SERPL-MCNC: 19 MG/DL (ref 8–23)
CALCIUM SERPL-MCNC: 9.3 MG/DL (ref 8.7–10.5)
CHLORIDE SERPL-SCNC: 107 MMOL/L (ref 95–110)
CLARITY UR: CLEAR
CO2 SERPL-SCNC: 22 MMOL/L (ref 23–29)
COLOR UR: YELLOW
CREAT SERPL-MCNC: 1.1 MG/DL (ref 0.5–1.4)
DIFFERENTIAL METHOD BLD: ABNORMAL
EOSINOPHIL # BLD AUTO: 0.1 K/UL (ref 0–0.5)
EOSINOPHIL NFR BLD: 1.7 % (ref 0–8)
ERYTHROCYTE [DISTWIDTH] IN BLOOD BY AUTOMATED COUNT: 18.7 % (ref 11.5–14.5)
EST. GFR  (NO RACE VARIABLE): 55.4 ML/MIN/1.73 M^2
FERRITIN SERPL-MCNC: 11 NG/ML (ref 20–300)
GLUCOSE SERPL-MCNC: 97 MG/DL (ref 70–110)
GLUCOSE UR QL STRIP: NEGATIVE
HCT VFR BLD AUTO: 39.5 % (ref 37–48.5)
HGB BLD-MCNC: 12.5 G/DL (ref 12–16)
HGB UR QL STRIP: ABNORMAL
IMM GRANULOCYTES # BLD AUTO: 0.03 K/UL (ref 0–0.04)
IMM GRANULOCYTES NFR BLD AUTO: 0.4 % (ref 0–0.5)
KETONES UR QL STRIP: NEGATIVE
LEUKOCYTE ESTERASE UR QL STRIP: NEGATIVE
LYMPHOCYTES # BLD AUTO: 2.5 K/UL (ref 1–4.8)
LYMPHOCYTES NFR BLD: 31.1 % (ref 18–48)
MCH RBC QN AUTO: 25.6 PG (ref 27–31)
MCHC RBC AUTO-ENTMCNC: 31.6 G/DL (ref 32–36)
MCV RBC AUTO: 81 FL (ref 82–98)
MONOCYTES # BLD AUTO: 0.7 K/UL (ref 0.3–1)
MONOCYTES NFR BLD: 8.4 % (ref 4–15)
NEUTROPHILS # BLD AUTO: 4.6 K/UL (ref 1.8–7.7)
NEUTROPHILS NFR BLD: 57.9 % (ref 38–73)
NITRITE UR QL STRIP: NEGATIVE
NRBC BLD-RTO: 0 /100 WBC
PH UR STRIP: 6 [PH] (ref 5–8)
PLATELET # BLD AUTO: 345 K/UL (ref 150–450)
PMV BLD AUTO: 9.8 FL (ref 9.2–12.9)
POTASSIUM SERPL-SCNC: 4.1 MMOL/L (ref 3.5–5.1)
PROT SERPL-MCNC: 7 G/DL (ref 6–8.4)
PROT UR QL STRIP: NEGATIVE
RBC # BLD AUTO: 4.89 M/UL (ref 4–5.4)
SODIUM SERPL-SCNC: 141 MMOL/L (ref 136–145)
SP GR UR STRIP: 1.01 (ref 1–1.03)
URN SPEC COLLECT METH UR: ABNORMAL
WBC # BLD AUTO: 7.87 K/UL (ref 3.9–12.7)

## 2024-06-20 PROCEDURE — 1101F PT FALLS ASSESS-DOCD LE1/YR: CPT | Mod: CPTII,S$GLB,, | Performed by: INTERNAL MEDICINE

## 2024-06-20 PROCEDURE — 80053 COMPREHEN METABOLIC PANEL: CPT | Mod: PN | Performed by: INTERNAL MEDICINE

## 2024-06-20 PROCEDURE — 3008F BODY MASS INDEX DOCD: CPT | Mod: CPTII,S$GLB,, | Performed by: INTERNAL MEDICINE

## 2024-06-20 PROCEDURE — 99999 PR PBB SHADOW E&M-EST. PATIENT-LVL IV: CPT | Mod: PBBFAC,,, | Performed by: INTERNAL MEDICINE

## 2024-06-20 PROCEDURE — 1126F AMNT PAIN NOTED NONE PRSNT: CPT | Mod: CPTII,S$GLB,, | Performed by: INTERNAL MEDICINE

## 2024-06-20 PROCEDURE — G2211 COMPLEX E/M VISIT ADD ON: HCPCS | Mod: S$GLB,,, | Performed by: INTERNAL MEDICINE

## 2024-06-20 PROCEDURE — 36415 COLL VENOUS BLD VENIPUNCTURE: CPT | Mod: PN | Performed by: INTERNAL MEDICINE

## 2024-06-20 PROCEDURE — 85025 COMPLETE CBC W/AUTO DIFF WBC: CPT | Mod: PN | Performed by: INTERNAL MEDICINE

## 2024-06-20 PROCEDURE — 1160F RVW MEDS BY RX/DR IN RCRD: CPT | Mod: CPTII,S$GLB,, | Performed by: INTERNAL MEDICINE

## 2024-06-20 PROCEDURE — 3077F SYST BP >= 140 MM HG: CPT | Mod: CPTII,S$GLB,, | Performed by: INTERNAL MEDICINE

## 2024-06-20 PROCEDURE — 3079F DIAST BP 80-89 MM HG: CPT | Mod: CPTII,S$GLB,, | Performed by: INTERNAL MEDICINE

## 2024-06-20 PROCEDURE — 81003 URINALYSIS AUTO W/O SCOPE: CPT | Mod: PN | Performed by: INTERNAL MEDICINE

## 2024-06-20 PROCEDURE — 99205 OFFICE O/P NEW HI 60 MIN: CPT | Mod: S$GLB,,, | Performed by: INTERNAL MEDICINE

## 2024-06-20 PROCEDURE — 1159F MED LIST DOCD IN RCRD: CPT | Mod: CPTII,S$GLB,, | Performed by: INTERNAL MEDICINE

## 2024-06-20 PROCEDURE — 82728 ASSAY OF FERRITIN: CPT | Performed by: INTERNAL MEDICINE

## 2024-06-20 PROCEDURE — 3288F FALL RISK ASSESSMENT DOCD: CPT | Mod: CPTII,S$GLB,, | Performed by: INTERNAL MEDICINE

## 2024-06-20 PROCEDURE — 3044F HG A1C LEVEL LT 7.0%: CPT | Mod: CPTII,S$GLB,, | Performed by: INTERNAL MEDICINE

## 2024-06-20 NOTE — PROGRESS NOTES
Subjective     Patient ID: Balwinder Rodriguez is a 68 y.o. female.    Chief Complaint: No chief complaint on file.    HPI  Mrs. Rodriguez is a 68 year old female referred for evaluation for longstanding iron deficiency anemia.  Most recent labs are from April 2, 2024 and had shown a ferritin of 18 ng/mL.  Her ferritin was 5 ng/mL on 02/01/2024, and 4 ng/mL on July 31, 2019.  Most recent CBC is from 04/02/2024 and had shown a white count of 6100 per cubic mm, hemoglobin 10.3 grams/deciliter, hematocrit 33.5%, MCV 85 and platelets 411 K.  A CBC on 02/05/2024 had shown a white count of 73935 per cubic mm, hemoglobin 8.8 grams/deciliter, hematocrit 26.9%, MCV 88 and platelets 282 K.  A CBC on 11/22/2019 had shown a white count of 14,200 per cubic mm, hemoglobin 12.1 grams/deciliter, hematocrit 42.4%, MCV 82 and platelets 392 K.  A CBC on 07/05/2019 had shown a white count of 15,400/mm3, hemoglobin 7.7 grams/deciliter, hematocrit 24.8%, MCV 78 and platelets 465 K.    A colonoscopy in August 2019 was normal.  An EGD in early February 2024 had shown a large clot in her stomach fundus and evidence of her gastric bypass.  There were no ulcers seen.  She states that she has had multiple AVMs seen on multiple prior EGDs that have been cauterized.    PAST MEDICAL HISTORY:  As above.  She has a history of AVMs that have presented with severe anemia.  She states that several years ago her hemoglobin was down to 2.6 grams/dL.  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  She does not smoke and does not drink alcohol.  She is   FAMILY HISTORY:  Grown father with colon cancer        Review of Systems  Overall she feels well.  She states that she has been on oral iron on multiple times and has been taking 1 pill a day since early March 2024.  She is also taking a B12 pill on a daily basis.  She  states that her stamina has improved with the oral iron supplementation.  When asked, she admits to PICA for ice on multiple occasions in the past and also admits to restless legs.  She states that both the PICA for ice and restless legs have improved with the oral iron supplementation therapy that she started in March.    When asked, she states that she has had several episodes of tarry stools in the past..  Stool the current a black from the oral iron supplements but they are not tarry.  She has never seen bright red blood per rectum  She denies any anxiety, depression, easy bruising, fevers, chills, night  sweats, weight loss, nausea, vomiting, diarrhea, constipation, diplopia,     blurred vision, headache, chest pain, palpitations, shortness of breath, breast pain, abdominal pain, extremity pain, or difficulty ambulating.  The remainder of the ten-point ROS, including general, skin, lymph, H/N, cardiorespiratory, GI, , Neuro, Endocrine, and psychiatric is negative.   Physical Exam       She is alert, oriented to time, place, person, pleasant, well      nourished, in no acute physical distress.    She is here with her                                 VITAL SIGNS:  Reviewed                                      HEENT:  Normal.  There are no nasal, oral, lip, gingival, auricular, lid,    or conjunctival lesions.  Mucosae are moist and pink, and there is no        thrush.  Pupils are equal, reactive to light and accommodation.              Extraocular muscle movements are intact.  Dentition is poor (from chewing ice).  There is no frontal or maxillary tenderness.                                     NECK:  Supple without JVD, adenopathy, or thyromegaly.                       LUNGS:  Clear to auscultation without wheezing, rales, or rhonchi.           CARDIOVASCULAR:  Reveals an S1, S2, no murmurs, no rubs, no gallops.         ABDOMEN:  Soft, nontender, without organomegaly.  Bowel sounds are    present.  A  long median abdominal scar from the prior gastric bypass is identified                                                                    EXTREMITIES:  No cyanosis, clubbing, or edema.                               BREASTS:  Deferred                                  LYMPHATIC:  There is no cervical, axillary, or supraclavicular adenopathy.   SKIN:  Warm and moist, without petechiae, rashes, induration, or ecchymoses.           NEUROLOGIC:  DTRs are 0-1+ bilaterally, symmetrical, motor function is 5/5,  and cranial nerves are  within normal limits.    ASSESSMENT  History of gastric bypass  History of gastric AVM with intermittent bleeding  Evidence of chronic iron deficiency anemia    PLAN  I had a very long discussion with Mrs. Quiñones and her .  At this point we will proceed as follows:  1. She will have a repeat CBC, CMP and ferritin today  2. We will check her B12 level since she has been on oral but not sublingual B12  3. She will submit a urine sample to ascertain that she does not have hematuria  4. She will submit 3 stool cards  5. We will check a copper level  6.  I will send her a message through the portal tomorrow once I review her labs and let her know whether oral iron supplementation has resulted in improvement of her hematologic picture.  If it has, she will continue the oral iron up to a ferritin of 50 ng/mL.  However, if her ferritin is still low, she will be offered IV iron supplementation.  I do not feel that a full anemia workup is indicated to rule out hemolysis, hypothyroidism, etc since she has documented iron deficiency.  Her multiple questions were answered to her satisfaction.  I spent approximately 30 minutes reviewing the available records and 30 minutes evaluating the patient, in counseling and coordinating this patient's care.  Visit today included increased complexity associated with the care of iron deficiency state in the presence of a gastric bypass and multiple gastric  Cecilia.    6/21/2024 ADDENDUM  Her labs from yesterday were as follows:  WBCs 7800 per cubic mm, hemoglobin 12.5 grams/deciliter, hematocrit 39.5%, MCV 81 and platelets 345 K  Ferritin is still low at 11 ng/mL  Urinalysis shows trace hematuria  She was asked to continue the oral iron supplementation therapy since she has corrected her anemia and see me with a repeat CBC and a repeat ferritin in 4 weeks.  We will also repeat the urinalysis prior to next visit

## 2024-06-21 ENCOUNTER — PATIENT MESSAGE (OUTPATIENT)
Dept: HEMATOLOGY/ONCOLOGY | Facility: CLINIC | Age: 69
End: 2024-06-21
Payer: MEDICARE

## 2024-06-21 DIAGNOSIS — D50.0 IRON DEFICIENCY ANEMIA DUE TO CHRONIC BLOOD LOSS: Primary | ICD-10-CM

## 2024-06-27 ENCOUNTER — LAB VISIT (OUTPATIENT)
Dept: LAB | Facility: HOSPITAL | Age: 69
End: 2024-06-27
Attending: INTERNAL MEDICINE
Payer: MEDICARE

## 2024-06-27 DIAGNOSIS — D50.0 IRON DEFICIENCY ANEMIA DUE TO CHRONIC BLOOD LOSS: ICD-10-CM

## 2024-06-27 LAB
OB PNL STL: NEGATIVE

## 2024-06-27 PROCEDURE — 82272 OCCULT BLD FECES 1-3 TESTS: CPT | Mod: 91,PN | Performed by: INTERNAL MEDICINE

## 2024-07-19 ENCOUNTER — LAB VISIT (OUTPATIENT)
Dept: LAB | Facility: HOSPITAL | Age: 69
End: 2024-07-19
Attending: INTERNAL MEDICINE
Payer: MEDICARE

## 2024-07-19 DIAGNOSIS — D50.0 IRON DEFICIENCY ANEMIA DUE TO CHRONIC BLOOD LOSS: ICD-10-CM

## 2024-07-19 LAB
BACTERIA #/AREA URNS HPF: ABNORMAL /HPF
BASOPHILS # BLD AUTO: 0.04 K/UL (ref 0–0.2)
BASOPHILS NFR BLD: 0.6 % (ref 0–1.9)
BILIRUB UR QL STRIP: NEGATIVE
CLARITY UR: CLEAR
COLOR UR: YELLOW
DIFFERENTIAL METHOD BLD: ABNORMAL
EOSINOPHIL # BLD AUTO: 0.2 K/UL (ref 0–0.5)
EOSINOPHIL NFR BLD: 2.2 % (ref 0–8)
ERYTHROCYTE [DISTWIDTH] IN BLOOD BY AUTOMATED COUNT: 17.9 % (ref 11.5–14.5)
FERRITIN SERPL-MCNC: 13 NG/ML (ref 20–300)
GLUCOSE UR QL STRIP: NEGATIVE
HCT VFR BLD AUTO: 37.6 % (ref 37–48.5)
HGB BLD-MCNC: 12.4 G/DL (ref 12–16)
HGB UR QL STRIP: NEGATIVE
IMM GRANULOCYTES # BLD AUTO: 0.03 K/UL (ref 0–0.04)
IMM GRANULOCYTES NFR BLD AUTO: 0.4 % (ref 0–0.5)
KETONES UR QL STRIP: NEGATIVE
LEUKOCYTE ESTERASE UR QL STRIP: ABNORMAL
LYMPHOCYTES # BLD AUTO: 2.4 K/UL (ref 1–4.8)
LYMPHOCYTES NFR BLD: 34.7 % (ref 18–48)
MCH RBC QN AUTO: 26.7 PG (ref 27–31)
MCHC RBC AUTO-ENTMCNC: 33 G/DL (ref 32–36)
MCV RBC AUTO: 81 FL (ref 82–98)
MICROSCOPIC COMMENT: ABNORMAL
MONOCYTES # BLD AUTO: 0.6 K/UL (ref 0.3–1)
MONOCYTES NFR BLD: 9.3 % (ref 4–15)
NEUTROPHILS # BLD AUTO: 3.6 K/UL (ref 1.8–7.7)
NEUTROPHILS NFR BLD: 52.8 % (ref 38–73)
NITRITE UR QL STRIP: NEGATIVE
NRBC BLD-RTO: 0 /100 WBC
PH UR STRIP: 6 [PH] (ref 5–8)
PLATELET # BLD AUTO: 287 K/UL (ref 150–450)
PMV BLD AUTO: 9.5 FL (ref 9.2–12.9)
PROT UR QL STRIP: NEGATIVE
RBC # BLD AUTO: 4.64 M/UL (ref 4–5.4)
SP GR UR STRIP: <=1.005 (ref 1–1.03)
SQUAMOUS #/AREA URNS HPF: 1 /HPF
URN SPEC COLLECT METH UR: ABNORMAL
WBC # BLD AUTO: 6.8 K/UL (ref 3.9–12.7)
WBC #/AREA URNS HPF: 6 /HPF (ref 0–5)
WBC CLUMPS URNS QL MICRO: ABNORMAL

## 2024-07-19 PROCEDURE — 81000 URINALYSIS NONAUTO W/SCOPE: CPT | Mod: PN | Performed by: INTERNAL MEDICINE

## 2024-07-19 PROCEDURE — 85025 COMPLETE CBC W/AUTO DIFF WBC: CPT | Mod: PN | Performed by: INTERNAL MEDICINE

## 2024-07-19 PROCEDURE — 82728 ASSAY OF FERRITIN: CPT | Performed by: INTERNAL MEDICINE

## 2024-07-19 PROCEDURE — 36415 COLL VENOUS BLD VENIPUNCTURE: CPT | Mod: PN | Performed by: INTERNAL MEDICINE

## 2024-07-28 DIAGNOSIS — K29.01 GASTROINTESTINAL HEMORRHAGE ASSOCIATED WITH ACUTE GASTRITIS: ICD-10-CM

## 2024-07-30 RX ORDER — PANTOPRAZOLE SODIUM 40 MG/1
40 TABLET, DELAYED RELEASE ORAL
Qty: 90 TABLET | Refills: 1 | Status: SHIPPED | OUTPATIENT
Start: 2024-07-30

## 2024-08-08 ENCOUNTER — OFFICE VISIT (OUTPATIENT)
Dept: HEMATOLOGY/ONCOLOGY | Facility: CLINIC | Age: 69
End: 2024-08-08
Payer: MEDICARE

## 2024-08-08 VITALS
HEIGHT: 62 IN | SYSTOLIC BLOOD PRESSURE: 154 MMHG | BODY MASS INDEX: 31.16 KG/M2 | WEIGHT: 169.31 LBS | TEMPERATURE: 97 F | HEART RATE: 92 BPM | DIASTOLIC BLOOD PRESSURE: 80 MMHG | OXYGEN SATURATION: 98 % | RESPIRATION RATE: 18 BRPM

## 2024-08-08 DIAGNOSIS — D62 ACUTE BLOOD LOSS ANEMIA: Primary | ICD-10-CM

## 2024-08-08 DIAGNOSIS — E53.8 B12 DEFICIENCY: ICD-10-CM

## 2024-08-08 PROCEDURE — 1159F MED LIST DOCD IN RCRD: CPT | Mod: CPTII,S$GLB,, | Performed by: INTERNAL MEDICINE

## 2024-08-08 PROCEDURE — 3079F DIAST BP 80-89 MM HG: CPT | Mod: CPTII,S$GLB,, | Performed by: INTERNAL MEDICINE

## 2024-08-08 PROCEDURE — 99215 OFFICE O/P EST HI 40 MIN: CPT | Mod: S$GLB,,, | Performed by: INTERNAL MEDICINE

## 2024-08-08 PROCEDURE — 3008F BODY MASS INDEX DOCD: CPT | Mod: CPTII,S$GLB,, | Performed by: INTERNAL MEDICINE

## 2024-08-08 PROCEDURE — 1101F PT FALLS ASSESS-DOCD LE1/YR: CPT | Mod: CPTII,S$GLB,, | Performed by: INTERNAL MEDICINE

## 2024-08-08 PROCEDURE — 3288F FALL RISK ASSESSMENT DOCD: CPT | Mod: CPTII,S$GLB,, | Performed by: INTERNAL MEDICINE

## 2024-08-08 PROCEDURE — 1160F RVW MEDS BY RX/DR IN RCRD: CPT | Mod: CPTII,S$GLB,, | Performed by: INTERNAL MEDICINE

## 2024-08-08 PROCEDURE — 3044F HG A1C LEVEL LT 7.0%: CPT | Mod: CPTII,S$GLB,, | Performed by: INTERNAL MEDICINE

## 2024-08-08 PROCEDURE — 99999 PR PBB SHADOW E&M-EST. PATIENT-LVL III: CPT | Mod: PBBFAC,,, | Performed by: INTERNAL MEDICINE

## 2024-08-08 PROCEDURE — G2211 COMPLEX E/M VISIT ADD ON: HCPCS | Mod: S$GLB,,, | Performed by: INTERNAL MEDICINE

## 2024-08-08 PROCEDURE — 1126F AMNT PAIN NOTED NONE PRSNT: CPT | Mod: CPTII,S$GLB,, | Performed by: INTERNAL MEDICINE

## 2024-08-08 PROCEDURE — 3077F SYST BP >= 140 MM HG: CPT | Mod: CPTII,S$GLB,, | Performed by: INTERNAL MEDICINE

## 2024-09-13 ENCOUNTER — PATIENT MESSAGE (OUTPATIENT)
Dept: HEMATOLOGY/ONCOLOGY | Facility: CLINIC | Age: 69
End: 2024-09-13
Payer: MEDICARE

## 2024-09-16 ENCOUNTER — LAB VISIT (OUTPATIENT)
Dept: LAB | Facility: HOSPITAL | Age: 69
End: 2024-09-16
Payer: MEDICARE

## 2024-09-16 DIAGNOSIS — D50.0 IRON DEFICIENCY ANEMIA DUE TO CHRONIC BLOOD LOSS: ICD-10-CM

## 2024-09-16 DIAGNOSIS — E53.8 B12 DEFICIENCY: ICD-10-CM

## 2024-09-16 LAB
ALBUMIN SERPL BCP-MCNC: 3.9 G/DL (ref 3.5–5.2)
ALP SERPL-CCNC: 66 U/L (ref 55–135)
ALT SERPL W/O P-5'-P-CCNC: 11 U/L (ref 10–44)
ANION GAP SERPL CALC-SCNC: 11 MMOL/L (ref 8–16)
AST SERPL-CCNC: 15 U/L (ref 10–40)
BASOPHILS # BLD AUTO: 0.04 K/UL (ref 0–0.2)
BASOPHILS NFR BLD: 0.6 % (ref 0–1.9)
BILIRUB SERPL-MCNC: 0.3 MG/DL (ref 0.1–1)
BUN SERPL-MCNC: 22 MG/DL (ref 8–23)
CALCIUM SERPL-MCNC: 9.2 MG/DL (ref 8.7–10.5)
CHLORIDE SERPL-SCNC: 107 MMOL/L (ref 95–110)
CO2 SERPL-SCNC: 24 MMOL/L (ref 23–29)
CREAT SERPL-MCNC: 1.2 MG/DL (ref 0.5–1.4)
DIFFERENTIAL METHOD BLD: ABNORMAL
EOSINOPHIL # BLD AUTO: 0.1 K/UL (ref 0–0.5)
EOSINOPHIL NFR BLD: 2 % (ref 0–8)
ERYTHROCYTE [DISTWIDTH] IN BLOOD BY AUTOMATED COUNT: 15.8 % (ref 11.5–14.5)
EST. GFR  (NO RACE VARIABLE): 49.6 ML/MIN/1.73 M^2
FERRITIN SERPL-MCNC: 26 NG/ML (ref 20–300)
GLUCOSE SERPL-MCNC: 99 MG/DL (ref 70–110)
HCT VFR BLD AUTO: 39.3 % (ref 37–48.5)
HGB BLD-MCNC: 12.8 G/DL (ref 12–16)
IMM GRANULOCYTES # BLD AUTO: 0.03 K/UL (ref 0–0.04)
IMM GRANULOCYTES NFR BLD AUTO: 0.5 % (ref 0–0.5)
LYMPHOCYTES # BLD AUTO: 2.2 K/UL (ref 1–4.8)
LYMPHOCYTES NFR BLD: 33.8 % (ref 18–48)
MCH RBC QN AUTO: 28.1 PG (ref 27–31)
MCHC RBC AUTO-ENTMCNC: 32.6 G/DL (ref 32–36)
MCV RBC AUTO: 86 FL (ref 82–98)
MONOCYTES # BLD AUTO: 0.5 K/UL (ref 0.3–1)
MONOCYTES NFR BLD: 8.3 % (ref 4–15)
NEUTROPHILS # BLD AUTO: 3.6 K/UL (ref 1.8–7.7)
NEUTROPHILS NFR BLD: 54.8 % (ref 38–73)
NRBC BLD-RTO: 0 /100 WBC
PLATELET # BLD AUTO: 300 K/UL (ref 150–450)
PMV BLD AUTO: 9.7 FL (ref 9.2–12.9)
POTASSIUM SERPL-SCNC: 4.2 MMOL/L (ref 3.5–5.1)
PROT SERPL-MCNC: 6.8 G/DL (ref 6–8.4)
RBC # BLD AUTO: 4.56 M/UL (ref 4–5.4)
SODIUM SERPL-SCNC: 142 MMOL/L (ref 136–145)
VIT B12 SERPL-MCNC: >2000 PG/ML (ref 210–950)
WBC # BLD AUTO: 6.5 K/UL (ref 3.9–12.7)

## 2024-09-16 PROCEDURE — 36415 COLL VENOUS BLD VENIPUNCTURE: CPT | Mod: PN | Performed by: INTERNAL MEDICINE

## 2024-09-16 PROCEDURE — 85025 COMPLETE CBC W/AUTO DIFF WBC: CPT | Mod: PN | Performed by: INTERNAL MEDICINE

## 2024-09-16 PROCEDURE — 82607 VITAMIN B-12: CPT | Performed by: INTERNAL MEDICINE

## 2024-09-16 PROCEDURE — 80053 COMPREHEN METABOLIC PANEL: CPT | Mod: PN | Performed by: INTERNAL MEDICINE

## 2024-09-16 PROCEDURE — 82728 ASSAY OF FERRITIN: CPT | Performed by: INTERNAL MEDICINE

## 2024-09-16 PROCEDURE — 82525 ASSAY OF COPPER: CPT | Performed by: INTERNAL MEDICINE

## 2024-09-18 NOTE — PROGRESS NOTES
PATIENT: Balwinder Rodriguez  MRN: [id number]  DATE: 9/19/2024      Diagnosis:   1. Acute blood loss anemia    2. B12 deficiency        Chief Complaint: Follow-up          Subjective:    Interval History: Ms. Rodriguez is a 69 y.o. female who returns for follow up for review of labs. Pt reports feeling much improved since last visit. Denies fever, chills, sob, cp, palpitations, swelling, fatigue, numbness, tingling, n/v, diarrhea, constipation, abdominal pain, new bumps, lumps, bleeding, bruising. She has continued to take po iron daily alternating every other day with twice daily due to GI tolerance issues.     CBC on 9/16/24 shows a white count of 6500 per cubic mm, hemoglobin 12.8 grams/deciliter, hematocrit 39.3%, MCV 86 and platelets 300 K.  Her ferritin has risen to 26 ng/mL.  Vit b12 >2000      Prior History:   Per Record:  June 20, 2024.  Workup at that time was as follows:  Ferritin was 11 ng/mL  3 stool samples were negative for occult blood  CBC showed a white count of 7800 per cubic mm, hemoglobin 12.5 grams/deciliter, hematocrit 39.5%, MCV 81 and platelets 345 K. of note, the CBC prior to that was from April 2, 2024 and had shown an H&H of 10.3 and 33.5% respectively with an MCV of 85.  Urinalysis on July 19, 2024 had shown no hematuria.     A repeat CBC on July 19, 2024 had shown a white count of 6800 per cubic mm, hemoglobin 12.4 grams/deciliter, hematocrit 37.8%, MCV 81 and platelets 287 K.  Her ferritin had risen to 13 ng/mL.        Briefly, she  is a 68 year old female referred for evaluation for longstanding iron deficiency anemia.  A CBC on April 2, 2024 had shown a ferritin of 18 ng/mL.  Her ferritin was 5 ng/mL on 02/01/2024, and 4 ng/mL on July 31, 2019.    A CBC on 02/05/2024 had shown a [place] 35473 per cubic mm, hemoglobin 8.8 grams/deciliter, hematocrit 26.9%, MCV 88 and platelets 282 K.  A CBC on 11/22/2019 had shown a white count of 14,200 per cubic mm, hemoglobin 12.1 grams/deciliter,  hematocrit 42.4%, MCV 82 and platelets 392 K.  A CBC on 07/05/2019 had shown a white count of 15,400/mm3, hemoglobin 7.7 grams/deciliter, hematocrit 24.8%, MCV 78 and platelets 465 K.     A colonoscopy in August 2019 was normal.  An EGD in early February 2024 had shown a large clot in her stomach fundus and evidence of her gastric bypass.  There were no ulcers seen.  She states that she has had multiple AVMs seen on multiple prior EGDs that have been cauterized.     Of note, she does have a history of AVMs that have presented with severe anemia.  She states that several years ago her hemoglobin was down to 2.6 grams/dL.  She has undergone cauterization of various AVMs multiple times.     She had undergone a gastric bypass in 1983.      She has been on oral iron on multiple times and has been taking 1 pill a day since early March 2024.  She is also taking a B12 pill on a daily basis.    When asked, she admits to PICA for ice on multiple occasions in the past and also admits to restless legs.  She states that both the picca for ice and restless legs have improved with the oral iron supplementation therapy that she started in March.    When asked, she states that she has never seen bright red blood per rectum      Past Medical History:   Past Medical History:   Diagnosis Date    AVM (arteriovenous malformation) of stomach, acquired with hemorrhage 1993    Last time cauterized 2012; Blood tranfusion 7/6/19    Encounter for blood transfusion     multiple       Past Surgical HIstory:   Past Surgical History:   Procedure Laterality Date    COLONOSCOPY  ~2015    Dr. Jacobsen    COLONOSCOPY  04/07/2009    Dr. Jacobsen, sent for scanning: melena throughout colon, prominent hemorrhoids, tortous colon, otherwise unremarkable findings    COLONOSCOPY N/A 8/9/2019    Procedure: COLONOSCOPY;  Surgeon: Dudley Cardenas Jr., MD;  Location: [facility];  Service: Endoscopy;  Laterality: N/A;    ENTEROSCOPIC PUSH PROCEDURE  04/08/2009      "Delmar, sent for scanning: distal esophageal erythema, gastric remnant appeared to be unremarkable, "the limbs of the Basilio-en-Y appeared to be unremarkable as well as the conduit from the stomach to the Basilio-en-Y"    ENTEROSCOPIC PUSH PROCEDURE  12/05/2009    Dr. Jacobsen, sent for scanning: blood seen, bleeder not found, suspect AVM, a couple of erosions in stomach    ENTEROSCOPIC PUSH PROCEDURE  12/06/2009    Dr. Jacobsen, sent for scanning: distal esophageal erythema, s/p gastric bypass, large arteriovenous malformation noted in the proximal jenunum successfully ablated, site tattooed    ESOPHAGOGASTRODUODENOSCOPY N/A 7/6/2019    Procedure: EGD (ESOPHAGOGASTRODUODENOSCOPY);  Surgeon: Virgilio Ochoa Jr., MD;  Location: [facility];  Service: Endoscopy;  Laterality: N/A;    ESOPHAGOGASTRODUODENOSCOPY N/A 2/1/2024    Procedure: ESOPHAGOGASTRODUODENOSCOPY (EGD);  Surgeon: Dudley Cardenas Jr., MD;  Location: [facility];  Service: Endoscopy;  Laterality: N/A;    ESOPHAGOGASTRODUODENOSCOPY N/A 2/2/2024    Procedure: ESOPHAGOGASTRODUODENOSCOPY (EGD);  Surgeon: Dudley Cardenas Jr., MD;  Location: [facility];  Service: Endoscopy;  Laterality: N/A;    gastric stapling      UPPER GASTROINTESTINAL ENDOSCOPY  07/06/2019    Dr. Ochoa, in notes: "S/P Basilio - N - Y gastric bypass" & "No source GIB seen"    UPPER GASTROINTESTINAL ENDOSCOPY  07/22/2010    EGD with push enteroscopy; Dr. Jacobsen, sent for scanning (hard to read handwriting)    UPPER GASTROINTESTINAL ENDOSCOPY  04/05/2009    Dr. Delacruz, sent for scanning: non-bleeding anastomotic ulcer, post gastric stapling    UPPER GASTROINTESTINAL ENDOSCOPY  12/04/2009    Dr. Jacobsen, sent for scanning, EGD with push enteroscopy: distal esophageal erythema and severe gastroparesis, blood seen in duodenum and proximal jejenum    video capsule endoscopy         Family History:   Family History   Problem Relation Name Age of Onset    Colon cancer Neg Hx      Crohn's disease Neg Hx      Ulcerative " colitis Neg Hx      Celiac disease Neg Hx      Esophageal cancer Neg Hx      Stomach cancer Neg Hx         Social History:  reports that she has never smoked. She has never used smokeless tobacco. She reports current alcohol use. She reports that she does not use drugs.    Allergies:  Review of patient's allergies indicates:  No Known Allergies    Medications:  Current Outpatient Medications   Medication Sig Dispense Refill    acetaminophen (TYLENOL) 325 MG tablet Take 2 tablets (650 mg total) by mouth every 4 (four) hours as needed for Pain.  0    cyanocobalamin (VITAMIN B-12) 1000 MCG tablet Take 1,000 mcg by mouth once daily.      ondansetron 4 mg/2 mL Soln Inject 4 mg into the vein every 6 (six) hours as needed (nausea). (Patient not taking: Reported on 3/6/2024)      pantoprazole (PROTONIX) 40 MG tablet Take 1 tablet (40 mg total) by mouth before breakfast. 90 tablet 1     No current facility-administered medications for this visit.       Review of Systems   Constitutional:  Negative for appetite change and unexpected weight change.   HENT:  Negative for mouth sores.    Eyes:  Negative for visual disturbance.   Respiratory:  Negative for cough and shortness of breath.    Cardiovascular:  Negative for chest pain.   Gastrointestinal:  Negative for abdominal pain and diarrhea.   Genitourinary:  Negative for frequency.   Musculoskeletal:  Negative for back pain.   Skin:  Negative for rash.   Neurological:  Negative for headaches.   Hematological:  Negative for adenopathy.   Psychiatric/Behavioral:  The patient is not nervous/anxious.        ECOG Performance Status:   ECOG SCORE             Objective:      Vitals: There were no vitals filed for this visit.  BMI: There is no height or weight on file to calculate BMI.    Physical Exam  HENT:      Head: Normocephalic.      Nose: Nose normal.      Mouth/Throat:      Mouth: Mucous membranes are moist.      Pharynx: Oropharynx is clear.   Eyes:      Pupils: Pupils are  equal, round, and reactive to light.   Cardiovascular:      Rate and Rhythm: Normal rate and regular rhythm.      Heart sounds: Normal heart sounds.   Pulmonary:      Effort: Pulmonary effort is normal.      Breath sounds: Normal breath sounds.   Abdominal:      General: Bowel sounds are normal.   Musculoskeletal:         General: Normal range of motion.      Cervical back: Normal range of motion.   Skin:     General: Skin is warm and dry.   Neurological:      Mental Status: She is alert and oriented to person, place, and time.   Psychiatric:         Mood and Affect: Mood normal.         Behavior: Behavior normal.         Laboratory Data:  Component      Latest Ref Rn 9/16/2024   WBC      3.90 - 12.70 K/uL 6.50    RBC      4.00 - 5.40 M/uL 4.56    Hemoglobin      12.0 - 16.0 g/dL 12.8    Hematocrit      37.0 - 48.5 % 39.3    MCV      82 - 98 fL 86    MCH      27.0 - 31.0 pg 28.1    MCHC      32.0 - 36.0 g/dL 32.6    RDW      11.5 - 14.5 % 15.8 (H)    Platelet Count      150 - 450 K/uL 300    MPV      9.2 - 12.9 fL 9.7    Immature Granulocytes      0.0 - 0.5 % 0.5    Gran # (ANC)      1.8 - 7.7 K/uL 3.6    Immature Grans (Abs)      0.00 - 0.04 K/uL 0.03    Lymph #      1.0 - 4.8 K/uL 2.2    Mono #      0.3 - 1.0 K/uL 0.5    Eos #      0.0 - 0.5 K/uL 0.1    Baso #      0.00 - 0.20 K/uL 0.04    nRBC      0 /100 WBC 0    Gran %      38.0 - 73.0 % 54.8    Lymph %      18.0 - 48.0 % 33.8    Mono %      4.0 - 15.0 % 8.3    Eos %      0.0 - 8.0 % 2.0    Basophil %      0.0 - 1.9 % 0.6    Differential Method Automated    Sodium      136 - 145 mmol/L 142    Potassium      3.5 - 5.1 mmol/L 4.2    Chloride      95 - 110 mmol/L 107    CO2      23 - 29 mmol/L 24    Glucose      70 - 110 mg/dL 99    BUN      8 - 23 mg/dL 22    Creatinine      0.5 - 1.4 mg/dL 1.2    Calcium      8.7 - 10.5 mg/dL 9.2    PROTEIN TOTAL      6.0 - 8.4 g/dL 6.8    Albumin      3.5 - 5.2 g/dL 3.9    BILIRUBIN TOTAL      0.1 - 1.0 mg/dL 0.3    ALP       55 - 135 U/L 66    AST      10 - 40 U/L 15    ALT      10 - 44 U/L 11    eGFR      >60 mL/min/1.73 m^2 49.6 !    Anion Gap      8 - 16 mmol/L 11    Ferritin      20.0 - 300.0 ng/mL 26    Vitamin B12      210 - 950 pg/mL >2000 (H)       Legend:  (H) High  ! Abnormal       Imaging: Reviewed   Assessment:       1. Acute blood loss anemia    2. B12 deficiency           Plan:       At this point we will proceed as follows:  1. She will remain on oral Fe supplementation therapy   2. She will follow up in 6 weeks with repeat labs.  3. B12 once weekly per patient request  3. Copper level pending  4.  If the ferritin level does not continue to rise sufficiently by the time of her next visit we will consider IV iron supplementation therapy        Med Onc Chart Routing      Follow up with physician 6 weeks. with Dr Madrigal with repeat cbc, ferritin, vit b12 two days prior.   Follow up with MARGIE    Infusion scheduling note    Injection scheduling note    Labs    Imaging    Pharmacy appointment    Other referrals                  Plan was discussed with the patient at length, and she verbalized understanding. Balwinder was given an opportunity to ask questions that were answered to her satisfaction, and she was advised to call in the interval if any problems or questions arise.    Assessment/Plan reviewed and approved by Dr Madrigal    15 minutes were spent in coordination of patient's care, record review and counseling.    SARA Rosario, FNP-C  Hematology & Oncology

## 2024-09-19 ENCOUNTER — OFFICE VISIT (OUTPATIENT)
Dept: HEMATOLOGY/ONCOLOGY | Facility: CLINIC | Age: 69
End: 2024-09-19
Payer: MEDICARE

## 2024-09-19 VITALS
HEIGHT: 62 IN | SYSTOLIC BLOOD PRESSURE: 139 MMHG | OXYGEN SATURATION: 98 % | TEMPERATURE: 98 F | HEART RATE: 77 BPM | DIASTOLIC BLOOD PRESSURE: 80 MMHG | BODY MASS INDEX: 32.17 KG/M2 | WEIGHT: 174.81 LBS | RESPIRATION RATE: 17 BRPM

## 2024-09-19 DIAGNOSIS — E53.8 B12 DEFICIENCY: ICD-10-CM

## 2024-09-19 DIAGNOSIS — D62 ACUTE BLOOD LOSS ANEMIA: Primary | ICD-10-CM

## 2024-09-19 LAB — COPPER SERPL-MCNC: 987 UG/L (ref 810–1990)

## 2024-09-19 PROCEDURE — 99999 PR PBB SHADOW E&M-EST. PATIENT-LVL III: CPT | Mod: PBBFAC,,,

## 2024-10-31 DIAGNOSIS — K29.01 GASTROINTESTINAL HEMORRHAGE ASSOCIATED WITH ACUTE GASTRITIS: ICD-10-CM

## 2024-11-04 RX ORDER — PANTOPRAZOLE SODIUM 40 MG/1
40 TABLET, DELAYED RELEASE ORAL
Qty: 90 TABLET | Refills: 1 | Status: SHIPPED | OUTPATIENT
Start: 2024-11-04

## 2024-11-12 ENCOUNTER — LAB VISIT (OUTPATIENT)
Dept: LAB | Facility: HOSPITAL | Age: 69
End: 2024-11-12
Payer: MEDICARE

## 2024-11-12 DIAGNOSIS — D62 ACUTE BLOOD LOSS ANEMIA: ICD-10-CM

## 2024-11-12 DIAGNOSIS — E53.8 B12 DEFICIENCY: ICD-10-CM

## 2024-11-12 LAB
BASOPHILS # BLD AUTO: 0.06 K/UL (ref 0–0.2)
BASOPHILS NFR BLD: 0.9 % (ref 0–1.9)
DIFFERENTIAL METHOD BLD: ABNORMAL
EOSINOPHIL # BLD AUTO: 0.2 K/UL (ref 0–0.5)
EOSINOPHIL NFR BLD: 2.4 % (ref 0–8)
ERYTHROCYTE [DISTWIDTH] IN BLOOD BY AUTOMATED COUNT: 13.9 % (ref 11.5–14.5)
FERRITIN SERPL-MCNC: 32 NG/ML (ref 20–300)
HCT VFR BLD AUTO: 42.4 % (ref 37–48.5)
HGB BLD-MCNC: 13.6 G/DL (ref 12–16)
IMM GRANULOCYTES # BLD AUTO: 0.06 K/UL (ref 0–0.04)
IMM GRANULOCYTES NFR BLD AUTO: 0.9 % (ref 0–0.5)
LYMPHOCYTES # BLD AUTO: 2 K/UL (ref 1–4.8)
LYMPHOCYTES NFR BLD: 29.8 % (ref 18–48)
MCH RBC QN AUTO: 28.3 PG (ref 27–31)
MCHC RBC AUTO-ENTMCNC: 32.1 G/DL (ref 32–36)
MCV RBC AUTO: 88 FL (ref 82–98)
MONOCYTES # BLD AUTO: 0.9 K/UL (ref 0.3–1)
MONOCYTES NFR BLD: 13.1 % (ref 4–15)
NEUTROPHILS # BLD AUTO: 3.5 K/UL (ref 1.8–7.7)
NEUTROPHILS NFR BLD: 52.9 % (ref 38–73)
NRBC BLD-RTO: 0 /100 WBC
PLATELET # BLD AUTO: 304 K/UL (ref 150–450)
PMV BLD AUTO: 9.5 FL (ref 9.2–12.9)
RBC # BLD AUTO: 4.8 M/UL (ref 4–5.4)
VIT B12 SERPL-MCNC: 1384 PG/ML (ref 210–950)
WBC # BLD AUTO: 6.54 K/UL (ref 3.9–12.7)

## 2024-11-12 PROCEDURE — 82607 VITAMIN B-12: CPT

## 2024-11-12 PROCEDURE — 36415 COLL VENOUS BLD VENIPUNCTURE: CPT | Mod: PN

## 2024-11-12 PROCEDURE — 85025 COMPLETE CBC W/AUTO DIFF WBC: CPT | Mod: PN

## 2024-11-12 PROCEDURE — 82728 ASSAY OF FERRITIN: CPT

## 2024-11-14 ENCOUNTER — TELEPHONE (OUTPATIENT)
Dept: HEMATOLOGY/ONCOLOGY | Facility: CLINIC | Age: 69
End: 2024-11-14
Payer: MEDICARE

## 2024-11-14 NOTE — TELEPHONE ENCOUNTER
----- Message from Sovex Nesbitt sent at 11/14/2024 10:17 AM CST -----  Type: Needs Medical Advice  Who Called:  henrietta  Best Call Back Number: [phone]    Additional Information: henrietta states she needs to reschedule her 11/15 with above provider please call to further discuss thank you.

## 2024-11-14 NOTE — TELEPHONE ENCOUNTER
Pt requesting r/s 11/15 appt with Dr. Herron d/t fever and flu like symptoms. Accepted and confirmed r/s to 11/21 at 10 am.

## 2024-11-20 NOTE — PROGRESS NOTES
PATIENT: Balwinder Rodriguez  MRN: [id number]  DATE: 11/21/2024      Diagnosis:   1. Acute blood loss anemia    2. Iron deficiency anemia due to chronic blood loss    3. B12 deficiency        Chief Complaint: Follow-up (6 wk follow up with labs done 11/12/)          Subjective:    Interval History: Ms. Rodriguez is a 69 y.o. female who returns for follow up for review of labs. Pt reports feeling much improved since last visit. Denies fever, chills, sob, cp, palpitations, swelling, fatigue, numbness, tingling, n/v, diarrhea, constipation, abdominal pain, new bumps, lumps, bleeding, bruising. She has continued to take po iron daily alternating every other day. She reports taking her B12 weekly.     CBC on 11/12/24 shows a white count of 6540 per cubic mm, hemoglobin 13.6 grams/deciliter, hematocrit 42.4%, MCV 88 and platelets 304 K.  Her ferritin has risen to 32 ng/mL.    Copper level drawn on 9/16/24 was 987 ug/L    CBC on 9/16/24 shows a white count of 6500 per cubic mm, hemoglobin 12.8 grams/deciliter, hematocrit 39.3%, MCV 86 and platelets 300 K.  Her ferritin has risen to 26 ng/mL.  Vit b12 >2000      Prior History:   Per Record:  June 20, 2024.  Workup at that time was as follows:  Ferritin was 11 ng/mL  3 stool samples were negative for occult blood  CBC showed a white count of 7800 per cubic mm, hemoglobin 12.5 grams/deciliter, hematocrit 39.5%, MCV 81 and platelets 345 K. of note, the CBC prior to that was from April 2, 2024 and had shown an H&H of 10.3 and 33.5% respectively with an MCV of 85.  Urinalysis on July 19, 2024 had shown no hematuria.     A repeat CBC on July 19, 2024 had shown a white count of 6800 per cubic mm, hemoglobin 12.4 grams/deciliter, hematocrit 37.8%, MCV 81 and platelets 287 K.  Her ferritin had risen to 13 ng/mL.        Briefly, she  is a 68 year old female referred for evaluation for longstanding iron deficiency anemia.  A CBC on April 2, 2024 had shown a ferritin of 18 ng/mL.  Her ferritin  was 5 ng/mL on 02/01/2024, and 4 ng/mL on July 31, 2019.    A CBC on 02/05/2024 had shown a [place] 03863 per cubic mm, hemoglobin 8.8 grams/deciliter, hematocrit 26.9%, MCV 88 and platelets 282 K.  A CBC on 11/22/2019 had shown a white count of 14,200 per cubic mm, hemoglobin 12.1 grams/deciliter, hematocrit 42.4%, MCV 82 and platelets 392 K.  A CBC on 07/05/2019 had shown a white count of 15,400/mm3, hemoglobin 7.7 grams/deciliter, hematocrit 24.8%, MCV 78 and platelets 465 K.     A colonoscopy in August 2019 was normal.  An EGD in early February 2024 had shown a large clot in her stomach fundus and evidence of her gastric bypass.  There were no ulcers seen.  She states that she has had multiple AVMs seen on multiple prior EGDs that have been cauterized.     Of note, she does have a history of AVMs that have presented with severe anemia.  She states that several years ago her hemoglobin was down to 2.6 grams/dL.  She has undergone cauterization of various AVMs multiple times.     She had undergone a gastric bypass in 1983.      She has been on oral iron on multiple times and has been taking 1 pill a day since early March 2024.  She is also taking a B12 pill on a daily basis.    When asked, she admits to PICA for ice on multiple occasions in the past and also admits to restless legs.  She states that both the picca for ice and restless legs have improved with the oral iron supplementation therapy that she started in March.    When asked, she states that she has never seen bright red blood per rectum      Past Medical History:   Past Medical History:   Diagnosis Date    AVM (arteriovenous malformation) of stomach, acquired with hemorrhage 1993    Last time cauterized 2012; Blood tranfusion 7/6/19    Encounter for blood transfusion     multiple       Past Surgical HIstory:   Past Surgical History:   Procedure Laterality Date    COLONOSCOPY  ~2015    Dr. Jacobsen    COLONOSCOPY  04/07/2009    Dr. Jacobsen, sent for  "scanning: melena throughout colon, prominent hemorrhoids, tortous colon, otherwise unremarkable findings    COLONOSCOPY N/A 8/9/2019    Procedure: COLONOSCOPY;  Surgeon: Dudley Cardenas Jr., MD;  Location: [facility];  Service: Endoscopy;  Laterality: N/A;    ENTEROSCOPIC PUSH PROCEDURE  04/08/2009    Dr. Jacobsen, sent for scanning: distal esophageal erythema, gastric remnant appeared to be unremarkable, "the limbs of the Basilio-en-Y appeared to be unremarkable as well as the conduit from the stomach to the Basilio-en-Y"    ENTEROSCOPIC PUSH PROCEDURE  12/05/2009    Dr. Jacobsen, sent for scanning: blood seen, bleeder not found, suspect AVM, a couple of erosions in stomach    ENTEROSCOPIC PUSH PROCEDURE  12/06/2009    Dr. Jacobsen, sent for scanning: distal esophageal erythema, s/p gastric bypass, large arteriovenous malformation noted in the proximal jenunum successfully ablated, site tattooed    ESOPHAGOGASTRODUODENOSCOPY N/A 7/6/2019    Procedure: EGD (ESOPHAGOGASTRODUODENOSCOPY);  Surgeon: Virgilio Ochoa Jr., MD;  Location: [facility];  Service: Endoscopy;  Laterality: N/A;    ESOPHAGOGASTRODUODENOSCOPY N/A 2/1/2024    Procedure: ESOPHAGOGASTRODUODENOSCOPY (EGD);  Surgeon: Dudley Cardenas Jr., MD;  Location: [facility];  Service: Endoscopy;  Laterality: N/A;    ESOPHAGOGASTRODUODENOSCOPY N/A 2/2/2024    Procedure: ESOPHAGOGASTRODUODENOSCOPY (EGD);  Surgeon: Dudley Cardenas Jr., MD;  Location: [facility];  Service: Endoscopy;  Laterality: N/A;    gastric stapling      UPPER GASTROINTESTINAL ENDOSCOPY  07/06/2019    Dr. Ochoa, in notes: "S/P Basilio - N - Y gastric bypass" & "No source GIB seen"    UPPER GASTROINTESTINAL ENDOSCOPY  07/22/2010    EGD with push enteroscopy; Dr. Jacobsen, sent for scanning (hard to read handwriting)    UPPER GASTROINTESTINAL ENDOSCOPY  04/05/2009    Dr. Delacruz, sent for scanning: non-bleeding anastomotic ulcer, post gastric stapling    UPPER GASTROINTESTINAL ENDOSCOPY  12/04/2009    Dr. Jacobsen, sent " for scanning, EGD with push enteroscopy: distal esophageal erythema and severe gastroparesis, blood seen in duodenum and proximal jejenum    video capsule endoscopy         Family History:   Family History   Problem Relation Name Age of Onset    Colon cancer Neg Hx      Crohn's disease Neg Hx      Ulcerative colitis Neg Hx      Celiac disease Neg Hx      Esophageal cancer Neg Hx      Stomach cancer Neg Hx         Social History:  reports that she has never smoked. She has never used smokeless tobacco. She reports current alcohol use. She reports that she does not use drugs.    Allergies:  Review of patient's allergies indicates:  No Known Allergies    Medications:  Current Outpatient Medications   Medication Sig Dispense Refill    acetaminophen (TYLENOL) 325 MG tablet Take 2 tablets (650 mg total) by mouth every 4 (four) hours as needed for Pain.  0    cyanocobalamin (VITAMIN B-12) 1000 MCG tablet Take 1,000 mcg by mouth once daily.      pantoprazole (PROTONIX) 40 MG tablet TAKE 1 TABLET(40 MG) BY MOUTH BEFORE BREAKFAST 90 tablet 1     No current facility-administered medications for this visit.       Review of Systems   Constitutional:  Negative for appetite change and unexpected weight change.   HENT:  Negative for mouth sores.    Eyes:  Negative for visual disturbance.   Respiratory:  Negative for cough and shortness of breath.    Cardiovascular:  Negative for chest pain.   Gastrointestinal:  Negative for abdominal pain and diarrhea.   Genitourinary:  Negative for frequency.   Musculoskeletal:  Negative for back pain.   Skin:  Negative for rash.   Neurological:  Negative for headaches.   Hematological:  Negative for adenopathy.   Psychiatric/Behavioral:  The patient is not nervous/anxious.        ECOG Performance Status:   ECOG SCORE             Objective:      Vitals:   Vitals:    11/21/24 1135   BP: (!) 170/83   BP Location: Left arm   Patient Position: Sitting   Pulse: 71   Resp: 17   Temp: 97.9 °F (36.6 °C)  "  TempSrc: Temporal   SpO2: 97%   Weight: 79.7 kg (175 lb 11.3 oz)   Height: 5' 2" (1.575 m)     BMI: Body mass index is 32.14 kg/m².    Physical Exam  HENT:      Head: Normocephalic.      Nose: Nose normal.      Mouth/Throat:      Mouth: Mucous membranes are moist.      Pharynx: Oropharynx is clear.   Eyes:      Pupils: Pupils are equal, round, and reactive to light.   Cardiovascular:      Rate and Rhythm: Normal rate and regular rhythm.      Heart sounds: Normal heart sounds.   Pulmonary:      Effort: Pulmonary effort is normal.      Breath sounds: Normal breath sounds.   Abdominal:      General: Bowel sounds are normal.   Musculoskeletal:         General: Normal range of motion.      Cervical back: Normal range of motion.   Skin:     General: Skin is warm and dry.   Neurological:      Mental Status: She is alert and oriented to person, place, and time.   Psychiatric:         Mood and Affect: Mood normal.         Behavior: Behavior normal.         Laboratory Data:  Component      Latest Ref [facility] 9/16/2024   WBC      3.90 - 12.70 K/uL 6.50    RBC      4.00 - 5.40 M/uL 4.56    Hemoglobin      12.0 - 16.0 g/dL 12.8    Hematocrit      37.0 - 48.5 % 39.3    MCV      82 - 98 fL 86    MCH      27.0 - 31.0 pg 28.1    MCHC      32.0 - 36.0 g/dL 32.6    RDW      11.5 - 14.5 % 15.8 (H)    Platelet Count      150 - 450 K/uL 300    MPV      9.2 - 12.9 fL 9.7    Immature Granulocytes      0.0 - 0.5 % 0.5    Gran # (ANC)      1.8 - 7.7 K/uL 3.6    Immature Grans (Abs)      0.00 - 0.04 K/uL 0.03    Lymph #      1.0 - 4.8 K/uL 2.2    Mono #      0.3 - 1.0 K/uL 0.5    Eos #      0.0 - 0.5 K/uL 0.1    Baso #      0.00 - 0.20 K/uL 0.04    nRBC      0 /100 WBC 0    Gran %      38.0 - 73.0 % 54.8    Lymph %      18.0 - 48.0 % 33.8    Mono %      4.0 - 15.0 % 8.3    Eos %      0.0 - 8.0 % 2.0    Basophil %      0.0 - 1.9 % 0.6    Differential Method Automated    Sodium      136 - 145 mmol/L 142    Potassium      3.5 - 5.1 mmol/L 4.2  "   Chloride      95 - 110 mmol/L 107    CO2      23 - 29 mmol/L 24    Glucose      70 - 110 mg/dL 99    BUN      8 - 23 mg/dL 22    Creatinine      0.5 - 1.4 mg/dL 1.2    Calcium      8.7 - 10.5 mg/dL 9.2    PROTEIN TOTAL      6.0 - 8.4 g/dL 6.8    Albumin      3.5 - 5.2 g/dL 3.9    BILIRUBIN TOTAL      0.1 - 1.0 mg/dL 0.3    ALP      55 - 135 U/L 66    AST      10 - 40 U/L 15    ALT      10 - 44 U/L 11    eGFR      >60 mL/min/1.73 m^2 49.6 !    Anion Gap      8 - 16 mmol/L 11    Ferritin      20.0 - 300.0 ng/mL 26    Vitamin B12      210 - 950 pg/mL >2000 (H)       Legend:  (H) High  ! Abnormal       Imaging: Reviewed   Assessment:       1. Acute blood loss anemia    2. Iron deficiency anemia due to chronic blood loss    3. B12 deficiency           Plan:         1. She will remain on oral Fe supplementation therapy   2. She will follow up in 6 months with repeat labs at her request. She knows to contact clinic if symptoms arise in the interim.   3. B12 once weekly per patient request  4.  If the ferritin level does not continue to rise sufficiently by the time of her next visit we will consider IV iron supplementation therapy        Med Onc Chart Routing      Follow up with physician    Follow up with MARGIE 6 months. With repeat cbc, ferritn, vit b12 at least 1 day prior to visit.   Infusion scheduling note    Injection scheduling note    Labs    Imaging    Pharmacy appointment    Other referrals                  Plan was discussed with the patient at length, and she verbalized understanding. Balwinder was given an opportunity to ask questions that were answered to her satisfaction, and she was advised to call in the interval if any problems or questions arise.    Assessment/Plan reviewed and approved by Dr Madrigal    15 minutes were spent in coordination of patient's care, record review and counseling.    Jeff Benitez, APRN, FNP-C  Hematology & Oncology

## 2024-11-21 ENCOUNTER — OFFICE VISIT (OUTPATIENT)
Dept: HEMATOLOGY/ONCOLOGY | Facility: CLINIC | Age: 69
End: 2024-11-21
Payer: MEDICARE

## 2024-11-21 VITALS
HEART RATE: 71 BPM | BODY MASS INDEX: 32.33 KG/M2 | DIASTOLIC BLOOD PRESSURE: 83 MMHG | WEIGHT: 175.69 LBS | HEIGHT: 62 IN | RESPIRATION RATE: 17 BRPM | OXYGEN SATURATION: 97 % | SYSTOLIC BLOOD PRESSURE: 170 MMHG | TEMPERATURE: 98 F

## 2024-11-21 DIAGNOSIS — D50.0 IRON DEFICIENCY ANEMIA DUE TO CHRONIC BLOOD LOSS: ICD-10-CM

## 2024-11-21 DIAGNOSIS — D62 ACUTE BLOOD LOSS ANEMIA: Primary | ICD-10-CM

## 2024-11-21 DIAGNOSIS — E53.8 B12 DEFICIENCY: ICD-10-CM

## 2024-11-21 PROCEDURE — 99999 PR PBB SHADOW E&M-EST. PATIENT-LVL III: CPT | Mod: PBBFAC,,,

## 2025-01-23 DIAGNOSIS — K29.01 GASTROINTESTINAL HEMORRHAGE ASSOCIATED WITH ACUTE GASTRITIS: ICD-10-CM

## 2025-01-23 RX ORDER — PANTOPRAZOLE SODIUM 40 MG/1
40 TABLET, DELAYED RELEASE ORAL
Qty: 90 TABLET | Refills: 1 | Status: SHIPPED | OUTPATIENT
Start: 2025-01-23

## 2025-05-19 ENCOUNTER — LAB VISIT (OUTPATIENT)
Dept: LAB | Facility: HOSPITAL | Age: 70
End: 2025-05-19
Payer: MEDICARE

## 2025-05-19 DIAGNOSIS — D50.0 IRON DEFICIENCY ANEMIA DUE TO CHRONIC BLOOD LOSS: ICD-10-CM

## 2025-05-19 DIAGNOSIS — E53.8 B12 DEFICIENCY: ICD-10-CM

## 2025-05-19 LAB
ABSOLUTE EOSINOPHIL (OHS): 0.12 K/UL
ABSOLUTE MONOCYTE (OHS): 0.58 K/UL (ref 0.3–1)
ABSOLUTE NEUTROPHIL COUNT (OHS): 3.99 K/UL (ref 1.8–7.7)
BASOPHILS # BLD AUTO: 0.04 K/UL
BASOPHILS NFR BLD AUTO: 0.6 %
ERYTHROCYTE [DISTWIDTH] IN BLOOD BY AUTOMATED COUNT: 14.2 % (ref 11.5–14.5)
FERRITIN SERPL-MCNC: 26 NG/ML (ref 20–300)
HCT VFR BLD AUTO: 39.4 % (ref 37–48.5)
HGB BLD-MCNC: 13 GM/DL (ref 12–16)
IMM GRANULOCYTES # BLD AUTO: 0.03 K/UL (ref 0–0.04)
IMM GRANULOCYTES NFR BLD AUTO: 0.4 % (ref 0–0.5)
LYMPHOCYTES # BLD AUTO: 2.2 K/UL (ref 1–4.8)
MCH RBC QN AUTO: 28 PG (ref 27–31)
MCHC RBC AUTO-ENTMCNC: 33 G/DL (ref 32–36)
MCV RBC AUTO: 85 FL (ref 82–98)
NUCLEATED RBC (/100WBC) (OHS): 0 /100 WBC
PLATELET # BLD AUTO: 300 K/UL (ref 150–450)
PMV BLD AUTO: 9.6 FL (ref 9.2–12.9)
RBC # BLD AUTO: 4.64 M/UL (ref 4–5.4)
RELATIVE EOSINOPHIL (OHS): 1.7 %
RELATIVE LYMPHOCYTE (OHS): 31.6 % (ref 18–48)
RELATIVE MONOCYTE (OHS): 8.3 % (ref 4–15)
RELATIVE NEUTROPHIL (OHS): 57.4 % (ref 38–73)
VIT B12 SERPL-MCNC: 1629 PG/ML (ref 210–950)
WBC # BLD AUTO: 6.96 K/UL (ref 3.9–12.7)

## 2025-05-19 PROCEDURE — 82607 VITAMIN B-12: CPT

## 2025-05-19 PROCEDURE — 36415 COLL VENOUS BLD VENIPUNCTURE: CPT | Mod: PN

## 2025-05-19 PROCEDURE — 82728 ASSAY OF FERRITIN: CPT

## 2025-05-19 PROCEDURE — 85025 COMPLETE CBC W/AUTO DIFF WBC: CPT | Mod: PN

## 2025-05-20 NOTE — PROGRESS NOTES
PATIENT: Balwinder Rodriguez  MRN: [id number]  DATE: 5/21/2025      Diagnosis:   1. Acute blood loss anemia    2. Iron deficiency anemia due to chronic blood loss    3. B12 deficiency        Chief Complaint: Follow-up (6 mth f/u with labs 5/19/)          Subjective:    Interval History: Ms. Rodriguez is a 69 y.o. female who returns for follow up for review of labs. Pt reports feeling improved overall. Denies fever, chills, sob, cp, palpitations, swelling, fatigue, numbness, tingling, n/v, diarrhea, constipation, abdominal pain, new bumps, lumps, bleeding, bruising. She has continued to take po iron daily alternating every other day. She reports taking her B12 weekly. Reports one episode of bleeding in late April.     CBC on 5/19/25 shows a white count of 6960 per cubic mm, hemoglobin 13.0 grams/deciliter, hematocrit 39.4%, MCV 85 and platelets 300 K.  Ferritin 26 ng/mL.  Vit b12 1629 pg/mL      Prior History:   CBC on 11/12/24 shows a white count of 6540 per cubic mm, hemoglobin 13.6 grams/deciliter, hematocrit 42.4%, MCV 88 and platelets 304 K.  Her ferritin has risen to 32 ng/mL.    Copper level drawn on 9/16/24 was 987 ug/L    CBC on 9/16/24 shows a white count of 6500 per cubic mm, hemoglobin 12.8 grams/deciliter, hematocrit 39.3%, MCV 86 and platelets 300 K.  Her ferritin has risen to 26 ng/mL.  Vit b12 >2000    Per Record:  June 20, 2024.  Workup at that time was as follows:  Ferritin was 11 ng/mL  3 stool samples were negative for occult blood  CBC showed a white count of 7800 per cubic mm, hemoglobin 12.5 grams/deciliter, hematocrit 39.5%, MCV 81 and platelets 345 K. of note, the CBC prior to that was from April 2, 2024 and had shown an H&H of 10.3 and 33.5% respectively with an MCV of 85.  Urinalysis on July 19, 2024 had shown no hematuria.     A repeat CBC on July 19, 2024 had shown a white count of 6800 per cubic mm, hemoglobin 12.4 grams/deciliter, hematocrit 37.8%, MCV 81 and platelets 287 K.  Her ferritin had  risen to 13 ng/mL.        Briefly, she  is a 68 year old female referred for evaluation for longstanding iron deficiency anemia.  A CBC on April 2, 2024 had shown a ferritin of 18 ng/mL.  Her ferritin was 5 ng/mL on 02/01/2024, and 4 ng/mL on July 31, 2019.    A CBC on 02/05/2024 had shown a white count of 32900 per cubic mm, hemoglobin 8.8 grams/deciliter, hematocrit 26.9%, MCV 88 and platelets 282 K.  A CBC on 11/22/2019 had shown a white count of 14,200 per cubic mm, hemoglobin 12.1 grams/deciliter, hematocrit 42.4%, MCV 82 and platelets 392 K.  A CBC on 07/05/2019 had shown a white count of 15,400/mm3, hemoglobin 7.7 grams/deciliter, hematocrit 24.8%, MCV 78 and platelets 465 K.     A colonoscopy in August 2019 was normal.  An EGD in early February 2024 had shown a large clot in her stomach fundus and evidence of her gastric bypass.  There were no ulcers seen.  She states that she has had multiple AVMs seen on multiple prior EGDs that have been cauterized.     Of note, she does have a history of AVMs that have presented with severe anemia.  She states that several years ago her hemoglobin was down to 2.6 grams/dL.  She has undergone cauterization of various AVMs multiple times.     She had undergone a gastric bypass in 1983.      She has been on oral iron on multiple times and has been taking 1 pill a day since early March 2024.  She is also taking a B12 pill on a daily basis.    When asked, she admits to PICA for ice on multiple occasions in the past and also admits to restless legs.  She states that both the picca for ice and restless legs have improved with the oral iron supplementation therapy that she started in March.    When asked, she states that she has never seen bright red blood per rectum      Past Medical History:   Past Medical History:   Diagnosis Date    AVM (arteriovenous malformation) of stomach, acquired with hemorrhage 1993    Last time cauterized 2012; Blood tranfusion 7/6/19    Encounter  "for blood transfusion     multiple       Past Surgical HIstory:   Past Surgical History:   Procedure Laterality Date    COLONOSCOPY  ~2015    Dr. Jacobsen    COLONOSCOPY  04/07/2009    Dr. Jacobsen, sent for scanning: melena throughout colon, prominent hemorrhoids, tortous colon, otherwise unremarkable findings    COLONOSCOPY N/A 8/9/2019    Procedure: COLONOSCOPY;  Surgeon: Dudley Cardenas Jr., MD;  Location: [facility];  Service: Endoscopy;  Laterality: N/A;    ENTEROSCOPIC PUSH PROCEDURE  04/08/2009    Dr. Jacobsen, sent for scanning: distal esophageal erythema, gastric remnant appeared to be unremarkable, "the limbs of the Basilio-en-Y appeared to be unremarkable as well as the conduit from the stomach to the Basilio-en-Y"    ENTEROSCOPIC PUSH PROCEDURE  12/05/2009    Dr. Jacobsen, sent for scanning: blood seen, bleeder not found, suspect AVM, a couple of erosions in stomach    ENTEROSCOPIC PUSH PROCEDURE  12/06/2009    Dr. Jacobsen, sent for scanning: distal esophageal erythema, s/p gastric bypass, large arteriovenous malformation noted in the proximal jenunum successfully ablated, site tattooed    ESOPHAGOGASTRODUODENOSCOPY N/A 7/6/2019    Procedure: EGD (ESOPHAGOGASTRODUODENOSCOPY);  Surgeon: Virgilio Ochoa Jr., MD;  Location: [facility];  Service: Endoscopy;  Laterality: N/A;    ESOPHAGOGASTRODUODENOSCOPY N/A 2/1/2024    Procedure: ESOPHAGOGASTRODUODENOSCOPY (EGD);  Surgeon: Dudley Cardenas Jr., MD;  Location: [facility];  Service: Endoscopy;  Laterality: N/A;    ESOPHAGOGASTRODUODENOSCOPY N/A 2/2/2024    Procedure: ESOPHAGOGASTRODUODENOSCOPY (EGD);  Surgeon: Dudley Cardenas Jr., MD;  Location: [facility];  Service: Endoscopy;  Laterality: N/A;    gastric stapling      UPPER GASTROINTESTINAL ENDOSCOPY  07/06/2019    Dr. Ochoa, in notes: "S/P Basilio - N - Y gastric bypass" & "No source GIB seen"    UPPER GASTROINTESTINAL ENDOSCOPY  07/22/2010    EGD with push enteroscopy; Dr. Jacobsen, sent for scanning (hard to read handwriting)    " UPPER GASTROINTESTINAL ENDOSCOPY  04/05/2009    Dr. Delacruz, sent for scanning: non-bleeding anastomotic ulcer, post gastric stapling    UPPER GASTROINTESTINAL ENDOSCOPY  12/04/2009    Dr. Jacobsen, sent for scanning, EGD with push enteroscopy: distal esophageal erythema and severe gastroparesis, blood seen in duodenum and proximal jejenum    video capsule endoscopy         Family History:   Family History   Problem Relation Name Age of Onset    Colon cancer Neg Hx      Crohn's disease Neg Hx      Ulcerative colitis Neg Hx      Celiac disease Neg Hx      Esophageal cancer Neg Hx      Stomach cancer Neg Hx         Social History:  reports that she has never smoked. She has never used smokeless tobacco. She reports current alcohol use. She reports that she does not use drugs.    Allergies:  Review of patient's allergies indicates:  No Known Allergies    Medications:  Current Outpatient Medications   Medication Sig Dispense Refill    acetaminophen (TYLENOL) 325 MG tablet Take 2 tablets (650 mg total) by mouth every 4 (four) hours as needed for Pain.  0    cyanocobalamin (VITAMIN B-12) 1000 MCG tablet Take 1,000 mcg by mouth once daily.      pantoprazole (PROTONIX) 40 MG tablet Take 1 tablet (40 mg total) by mouth before breakfast. 90 tablet 1     No current facility-administered medications for this visit.       Review of Systems   Constitutional:  Negative for appetite change and unexpected weight change.   HENT:  Negative for mouth sores.    Eyes:  Negative for visual disturbance.   Respiratory:  Negative for cough and shortness of breath.    Cardiovascular:  Negative for chest pain.   Gastrointestinal:  Negative for abdominal pain and diarrhea.   Genitourinary:  Negative for frequency.   Musculoskeletal:  Negative for back pain.   Skin:  Negative for rash.   Neurological:  Negative for headaches.   Hematological:  Negative for adenopathy.   Psychiatric/Behavioral:  The patient is not nervous/anxious.        ECOG  "Performance Status:   ECOG SCORE             Objective:      Vitals:   Vitals:    05/21/25 1123   BP: (!) 148/75   BP Location: Left arm   Patient Position: Sitting   Pulse: 65   Resp: 18   Temp: 97.7 °F (36.5 °C)   TempSrc: Temporal   SpO2: 97%   Weight: 79.4 kg (175 lb 0.7 oz)   Height: 5' 2" (1.575 m)       BMI: Body mass index is 32.02 kg/m².    Physical Exam  HENT:      Head: Normocephalic.      Nose: Nose normal.      Mouth/Throat:      Mouth: Mucous membranes are moist.      Pharynx: Oropharynx is clear.   Eyes:      Pupils: Pupils are equal, round, and reactive to light.   Cardiovascular:      Rate and Rhythm: Normal rate and regular rhythm.      Heart sounds: Normal heart sounds.   Pulmonary:      Effort: Pulmonary effort is normal.      Breath sounds: Normal breath sounds.   Abdominal:      General: Bowel sounds are normal.   Musculoskeletal:         General: Normal range of motion.      Cervical back: Normal range of motion.   Skin:     General: Skin is warm and dry.   Neurological:      Mental Status: She is alert and oriented to person, place, and time.   Psychiatric:         Mood and Affect: Mood normal.         Behavior: Behavior normal.         Laboratory Data:  Component      Latest Ref Rng 5/19/2025   WBC      3.90 - 12.70 K/uL 6.96    RBC      4.00 - 5.40 M/uL 4.64    Hemoglobin      12.0 - 16.0 gm/dL 13.0    Hematocrit      37.0 - 48.5 % 39.4    MCV      82 - 98 fL 85    MCH      27.0 - 31.0 pg 28.0    MCHC      32.0 - 36.0 g/dL 33.0    RDW      11.5 - 14.5 % 14.2    Platelet Count      150 - 450 K/uL 300    MPV      9.2 - 12.9 fL 9.6    nRBC      <=0 /100 WBC 0    Neut %      38 - 73 % 57.4    Lymph %      18 - 48 % 31.6    Mono %      4 - 15 % 8.3    Eos %      <=8 % 1.7    Basophil %      <=1.9 % 0.6    Immature Granulocytes      0.0 - 0.5 % 0.4    Gran # (ANC)      1.8 - 7.7 K/uL 3.99    Lymph #      1 - 4.8 K/uL 2.20    Mono #      0.3 - 1 K/uL 0.58    Eos #      <=0.5 K/uL 0.12    Baso #    "   <=0.2 K/uL 0.04    Immature Grans (Abs)      0.00 - 0.04 K/uL 0.03    Ferritin      20.0 - 300.0 ng/mL 26.0    Vitamin B12      210 - 950 pg/mL 1,629 (H)       Legend:  (H) High      Imaging: Reviewed   Assessment:       1. Acute blood loss anemia    2. Iron deficiency anemia due to chronic blood loss    3. B12 deficiency           Plan:       1. She will remain on oral Fe supplementation therapy   2. She will follow up in 6 months with repeat labs at her request. She knows to contact clinic if symptoms arise in the interim.   3. B12 once weekly per patient request  4.  If the ferritin level is decreased at her next visit we will consider IV iron supplementation therapy        Med Onc Chart Routing      Follow up with physician 6 months. With repeat cbc, ferritin, vit b12   Follow up with MARGIE    Infusion scheduling note    Injection scheduling note    Labs    Imaging    Pharmacy appointment    Other referrals                Plan was discussed with the patient at length, and she verbalized understanding. Balwinder was given an opportunity to ask questions that were answered to her satisfaction, and she was advised to call in the interval if any problems or questions arise.    Assessment/Plan reviewed and approved by Dr Madrigal    15 minutes were spent in coordination of patient's care, record review and counseling.    SARA Rosario, FNP-C  Hematology & Oncology

## 2025-05-21 ENCOUNTER — OFFICE VISIT (OUTPATIENT)
Dept: HEMATOLOGY/ONCOLOGY | Facility: CLINIC | Age: 70
End: 2025-05-21
Payer: MEDICARE

## 2025-05-21 VITALS
OXYGEN SATURATION: 97 % | SYSTOLIC BLOOD PRESSURE: 148 MMHG | WEIGHT: 175.06 LBS | HEIGHT: 62 IN | RESPIRATION RATE: 18 BRPM | BODY MASS INDEX: 32.22 KG/M2 | HEART RATE: 65 BPM | DIASTOLIC BLOOD PRESSURE: 75 MMHG | TEMPERATURE: 98 F

## 2025-05-21 DIAGNOSIS — E53.8 B12 DEFICIENCY: ICD-10-CM

## 2025-05-21 DIAGNOSIS — D62 ACUTE BLOOD LOSS ANEMIA: Primary | ICD-10-CM

## 2025-05-21 DIAGNOSIS — D50.0 IRON DEFICIENCY ANEMIA DUE TO CHRONIC BLOOD LOSS: ICD-10-CM

## 2025-05-21 PROCEDURE — 99999 PR PBB SHADOW E&M-EST. PATIENT-LVL III: CPT | Mod: PBBFAC,,,

## 2025-05-27 ENCOUNTER — OFFICE VISIT (OUTPATIENT)
Dept: URGENT CARE | Facility: CLINIC | Age: 70
End: 2025-05-27
Payer: MEDICARE

## 2025-05-27 VITALS
HEART RATE: 72 BPM | SYSTOLIC BLOOD PRESSURE: 156 MMHG | RESPIRATION RATE: 16 BRPM | OXYGEN SATURATION: 98 % | HEIGHT: 62 IN | DIASTOLIC BLOOD PRESSURE: 73 MMHG | TEMPERATURE: 98 F | WEIGHT: 175 LBS | BODY MASS INDEX: 32.2 KG/M2

## 2025-05-27 DIAGNOSIS — T63.304A SPIDER BITE WOUND, UNDETERMINED INTENT, INITIAL ENCOUNTER: ICD-10-CM

## 2025-05-27 DIAGNOSIS — L03.012 CELLULITIS OF LEFT LITTLE FINGER: Primary | ICD-10-CM

## 2025-05-27 PROCEDURE — 99204 OFFICE O/P NEW MOD 45 MIN: CPT | Mod: S$GLB,,, | Performed by: EMERGENCY MEDICINE

## 2025-05-27 RX ORDER — MUPIROCIN 20 MG/G
OINTMENT TOPICAL 2 TIMES DAILY
Qty: 22 G | Refills: 0 | Status: SHIPPED | OUTPATIENT
Start: 2025-05-27 | End: 2025-06-03

## 2025-05-27 RX ORDER — DOXYCYCLINE 100 MG/1
100 CAPSULE ORAL 2 TIMES DAILY
Qty: 14 CAPSULE | Refills: 0 | Status: SHIPPED | OUTPATIENT
Start: 2025-05-27 | End: 2025-06-03

## 2025-05-27 NOTE — PATIENT INSTRUCTIONS
Elevate hand or keep in neutral position to reduce swelling.     Tylenol for pain.     Keep covered.     Take antibiotic with food. While on antibiotics, take a daily probiotic such as Align or Culturelle or eat yogurt with live cultures (Activia is one example). This will lessen the chances of stomach upset.     Ok to wash with soap/water twice daily.     Recheck for new/worsening symptoms/concerns.     You must understand that you've received an Urgent Care treatment only and that you may be released before all your medical problems are known or treated. You, the patient, will arrange for follow up care as instructed.    Follow up with your PCP or specialty clinic as directed if not improved or as needed. You can call [phone] to schedule an appointment with the appropriate provider.      You, the patient, will arrange for follow up care as instructed.     If your condition worsens or fails to improve we recommend that you receive another evaluation at the ER immediately or contact your PCP to discuss your concerns.     Patient aware of treatment plan and verbalized understanding.

## 2025-05-27 NOTE — PROGRESS NOTES
"Subjective:      Patient ID: Balwinder Rodriguez is a 69 y.o. female.    Vitals:  height is 5' 2" (1.575 m) and weight is 79.4 kg (175 lb). Her oral temperature is 98.3 °F (36.8 °C). Her blood pressure is 156/73 (abnormal) and her pulse is 72. Her respiration is 16 and oxygen saturation is 98%.     Chief Complaint: Insect Bite    Patient c/o possible spider bite onset 2 days ago - was cleaning patio, lots of spider webs, felt something bite/sting her but did not see what it was. Has had red sore on left 5th finger since onset now with expanding redness, weeping fluid and mild pain/itchiness. Washing with soap/water. Red,swollen,itchy,fever. Pain scale 3/10. She is right handed.     Insect Bite  This is a new problem. The current episode started in the past 7 days. The problem has been unchanged. Pertinent negatives include no abdominal pain, anorexia, arthralgias, change in bowel habit, chest pain, chills, congestion, coughing, diaphoresis, fatigue, fever, headaches, joint swelling, myalgias, nausea, neck pain, numbness, rash, sore throat, swollen glands, urinary symptoms, vertigo, visual change, vomiting or weakness. Nothing aggravates the symptoms.       Constitution: Negative for chills, sweating, fatigue and fever.   HENT:  Negative for congestion and sore throat.    Neck: Negative for neck pain.   Cardiovascular:  Negative for chest pain.   Respiratory:  Negative for cough.    Gastrointestinal:  Negative for abdominal pain, nausea and vomiting.   Musculoskeletal:  Negative for joint pain, joint swelling and muscle ache.   Skin:  Positive for wound and erythema. Negative for rash.   Neurological:  Negative for history of vertigo, headaches and numbness.      Objective:     Physical Exam   HENT:   Mouth/Throat: Mucous membranes are moist.   Cardiovascular: Normal rate and normal pulses.   Pulmonary/Chest: Effort normal. No respiratory distress.   Abdominal: Normal appearance.   Neurological: She is alert.   Skin: " Skin is warm and dry. erythema         Comments: Left 5th finger: 1.5cm wound overlying skin of proximal phalanx with serous drainage, mild TTP, cellulitis extending proximally past MCP, full ROM, cap refill < 2 seconds, sensation intact, 2+ radial pulse   Nursing note and vitals reviewed.      Assessment:     1. Cellulitis of left little finger    2. Spider bite wound, undetermined intent, initial encounter        Plan:     Likely spider bite wound with cellulitis. Mupirocin and adhesive bandage applied in clinic.     Will Rx Mupirocin topically and Doxy.     Elevate. Cool compresses as needed.     Recheck for new/worsening concerns.    No Ibuprofen due to acute GI blood loss anemia requiring extensive tranfusions.     The chronic condition affected my medical decision making and treatment plan for today's visit. It also impacted the medications that were able to prescribed/recommended for the visit today.         Cellulitis of left little finger  -     doxycycline (VIBRAMYCIN) 100 MG Cap; Take 1 capsule (100 mg total) by mouth 2 (two) times daily. for 7 days  Dispense: 14 capsule; Refill: 0  -     mupirocin (BACTROBAN) 2 % ointment; Apply topically 2 (two) times daily. for 7 days  Dispense: 22 g; Refill: 0    Spider bite wound, undetermined intent, initial encounter  -     doxycycline (VIBRAMYCIN) 100 MG Cap; Take 1 capsule (100 mg total) by mouth 2 (two) times daily. for 7 days  Dispense: 14 capsule; Refill: 0  -     mupirocin (BACTROBAN) 2 % ointment; Apply topically 2 (two) times daily. for 7 days  Dispense: 22 g; Refill: 0        Patient Instructions   Elevate hand or keep in neutral position to reduce swelling.     Tylenol for pain.     Keep covered.     Take antibiotic with food. While on antibiotics, take a daily probiotic such as Align or Culturelle or eat yogurt with live cultures (Activia is one example). This will lessen the chances of stomach upset.     Ok to wash with soap/water twice daily.      Recheck for new/worsening symptoms/concerns.     You must understand that you've received an Urgent Care treatment only and that you may be released before all your medical problems are known or treated. You, the patient, will arrange for follow up care as instructed.    Follow up with your PCP or specialty clinic as directed if not improved or as needed. You can call [phone] to schedule an appointment with the appropriate provider.      You, the patient, will arrange for follow up care as instructed.     If your condition worsens or fails to improve we recommend that you receive another evaluation at the ER immediately or contact your PCP to discuss your concerns.     Patient aware of treatment plan and verbalized understanding.                  [Never (0 pts)] : Never (0 points) [No] : In the past 12 months have you used drugs other than those required for medical reasons? No [No falls in past year] : Patient reported no falls in the past year [0] : 2) Feeling down, depressed, or hopeless: Not at all (0) [With Family] : lives with family [Student] : student [Single] : single [Smoke Detector] : smoke detector [Carbon Monoxide Detector] : carbon monoxide detector [Seat Belt] :  uses seat belt [Sunscreen] : uses sunscreen [Good] : ~his/her~  mood as  good [] : No [de-identified] : no [Audit-CScore] : 0 [de-identified] : Walking [de-identified] : Regular  [EYR9Wmlpr] : 0 [HIV test declined] : HIV test declined [Change in mental status noted] : No change in mental status noted [Language] : denies difficulty with language [Behavior] : denies difficulty with behavior [Learning/Retaining New Information] : denies difficulty learning/retaining new information [Handling Complex Tasks] : denies difficulty handling complex tasks [Reasoning] : denies difficulty with reasoning [Spatial Ability and Orientation] : denies difficulty with spatial ability and orientation [None] : None [College] : College [Sexually Active] : not sexually active [High Risk Behavior] : no high risk behavior [Feels Safe at Home] : Feels safe at home [Fully functional (bathing, dressing, toileting, transferring, walking, feeding)] : Fully functional (bathing, dressing, toileting, transferring, walking, feeding) [Fully functional (using the telephone, shopping, preparing meals, housekeeping, doing laundry, using] : Fully functional and needs no help or supervision to perform IADLs (using the telephone, shopping, preparing meals, housekeeping, doing laundry, using transportation, managing medications and managing finances) [Reports changes in hearing] : Reports no changes in hearing [Reports changes in vision] : Reports no changes in vision [Reports normal functional visual acuity (ie: able to read med bottle)] : Reports normal functional visual acuity [Reports changes in dental health] : Reports no changes in dental health [Guns at Home] : no guns at home [Travel to Developing Areas] : does not  travel to developing areas [TB Exposure] : is not being exposed to tuberculosis [Caregiver Concerns] : does not have caregiver concerns [PapSmearComments] : not sexually active as yet  [de-identified] : Mother  [de-identified] : N